# Patient Record
Sex: MALE | Race: WHITE | Employment: FULL TIME | URBAN - METROPOLITAN AREA
[De-identification: names, ages, dates, MRNs, and addresses within clinical notes are randomized per-mention and may not be internally consistent; named-entity substitution may affect disease eponyms.]

---

## 2017-01-17 ENCOUNTER — TELEPHONE (OUTPATIENT)
Dept: ONCOLOGY | Age: 27
End: 2017-01-17

## 2017-01-17 NOTE — TELEPHONE ENCOUNTER
Called patient and left a voicemail reminding patient to have his labs drawn prior to his appointment with  on 1/26.

## 2017-01-26 ENCOUNTER — OFFICE VISIT (OUTPATIENT)
Dept: ONCOLOGY | Age: 27
End: 2017-01-26

## 2017-01-26 VITALS
BODY MASS INDEX: 23.19 KG/M2 | TEMPERATURE: 97.1 F | WEIGHT: 153 LBS | HEART RATE: 60 BPM | SYSTOLIC BLOOD PRESSURE: 120 MMHG | DIASTOLIC BLOOD PRESSURE: 70 MMHG | OXYGEN SATURATION: 96 % | HEIGHT: 68 IN | RESPIRATION RATE: 16 BRPM

## 2017-01-26 DIAGNOSIS — C62.11 MALIGNANT NEOPLASM OF DESCENDED RIGHT TESTIS (HCC): Primary | ICD-10-CM

## 2017-01-26 DIAGNOSIS — C78.00 MALIGNANT NEOPLASM METASTATIC TO LUNG, UNSPECIFIED LATERALITY (HCC): ICD-10-CM

## 2017-01-26 NOTE — MR AVS SNAPSHOT
Visit Information Date & Time Provider Department Dept. Phone Encounter #  
 1/26/2017  1:30 PM MD Megan Linonhavekellie Oncology at [address] [id number] Follow-up Instructions Return in about 3 months (around 4/26/2017) for Bertha, surveillance nonseminoma. Follow-up and Disposition History Your Appointments 4/20/2017  1:30 PM  
ESTABLISHED PATIENT with MD Lucia Lino Oncology at [address] [facility] [address] [place] [address]) Appt Note: Radindiana, surveillance nonseminoma [address] [facility], [address] [facility] [address] [address]  
[phone]  
  
   
 [address] [facility], [address] [place] [address] Upcoming Health Maintenance Date Due Pneumococcal 19-64 Highest Risk (1 of 3 - PCV13) 4/21/2008 DTaP/Tdap/Td series (3 - Td) 6/23/2026 Allergies as of 1/26/2017  Review Complete On: 1/26/2017 By: Flor Gustafson MD  
 No Known Allergies Current Immunizations  Reviewed on 12/21/2015 Name Date Hepatitis B Vaccine 1/1/1993 Influenza Vaccine 10/31/2016, 11/9/2015 Influenza Vaccine (Quad) PF 11/9/2015 MMR Vaccine 1/1/1994 TD Vaccine 1/1/2003 Tdap 6/23/2016 Not reviewed this visit You Were Diagnosed With   
  
 Codes Comments Malignant neoplasm of descended right testis [facility])    -  Primary ICD-10-CM: C62.11 ICD-9-CM: 186.9 Malignant neoplasm metastatic to lung, unspecified laterality ([facility] [address]     ICD-10-CM: C78.00 ICD-9-CM: 197.0 Vitals BP Pulse Temp Resp Height(growth percentile) 120/70 (BP 1 Location: Right arm, BP Patient Position: Sitting) 60 97.1 °F (36.2 °C) (Temporal) 16 5' 8\" (1.727 m) Weight(growth percentile) SpO2 BMI Smoking Status 153 lb (69.4 kg) 96% 23.26 kg/m2 Never Smoker BMI and BSA Data Body Mass Index Body Surface Area  
 23.26 kg/m 2 1.82 m 2 Preferred Pharmacy Pharmacy Name Phone [id number]- [place], [address] AT [place] [phone] Your Updated Medication List  
  
   
This list is accurate as of: 1/26/17  2:21 PM.  Always use your most recent med list.  
  
  
  
  
 FISH OIL 1,000 mg Cap Generic drug:  omega-3 fatty acids-vitamin e Take 1 Cap by mouth daily. therapeutic multivitamin tablet Commonly known as:  [facility] Take 1 Tab by mouth daily. We Performed the Following   
 AFP, TUMOR MARKER, SERIAL [[id number] Custom] BETA-HCG, QT, SERIAL [[id number] Custom] LD [64430 CPT(R)] Follow-up Instructions Return in about 3 months (around 4/26/2017) for Radindiana, surveillance nonseminoma. To-Do List   
 Around 04/24/2017 Imaging:  CT CHEST ABD PELV W CONT Introducing [facility] SERVICES! Dear Clyde Martinez: 
Thank you for requesting a [employer] account. Our records indicate that you already have an active Skip Hop account. You can access your account anytime at [email or web address]. [email or web address] Did you know that you can access your hospital and ER discharge instructions at any time in Skip Hop? You can also review all of your test results from your hospital stay or ER visit. Additional Information If you have questions, please visit the Frequently Asked Questions section of the Skip Hop website at [email or web address] Remember, Skip Hop is NOT to be used for urgent needs. For medical emergencies, dial 911. Now available from your iPhone and Android! Please provide this summary of care documentation to your next provider. Your primary care clinician is listed as Libby Santacruz. If you have any questions after today's visit, please call [phone].

## 2017-01-26 NOTE — PROGRESS NOTES
[address] [facility] at [address] [place] [address]  [phone]    Hematology / Oncology Follow Up    Reason for Visit:   Ailyn Oconnell is a 31 y.o. male who is seen for follow up of testicular cancer. Treatment History:    · Presented with right testicular mass  · US scrotum 6/4/2015: 9.3 cm mixed solid more than cystic mass replaces the normal architecture of the right testicle. Normal sonographic appearance of the left testicle. · CTA Chest 6/4/2015: Bilateral lung masses  · CT A/P 6/4/2015: Massive retroperitoneal adenopathy, large right testicular mass (9cm)  · MRI Brain 6/4/2015: no intracranial metastatic disease  · Preoperative tumor Markers 6/4/2015: , .5, BHCG [id number]  · Right orchiectomy 6/9/2015 with Dr. 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 · Thoracic surgery 11/7/2015 by Dr. Lion Valentino at [facility] 1036 lobectomy, LLL wedge resection and left superior segment wedge resection: Necrotic nodules with surrounding histiocytic reaction and focal squaumous epithelium. No definitive viable malignant cells identified, findings consistent with treatment effect and complete response to chemotherapy. History of Present Illness:   He reports some depression, worsening in the past few months.   He had neuropsych testing and was told to follow up with me for the results. Denies pain. Breathing nearly back to baseline. Energy has improved since therapy, but not back to his prediagnosis baseline. PAST HISTORY: The following sections were reviewed and updated in the EMR as appropriate: PMH, SH, FH, Medications, Allergies. No Known Allergies     Review of Systems: A complete review of systems was obtained, reviewed, and scanned into the EMR. Pertinent findings reviewed above. Physical Exam:     Visit Vitals    /70 (BP 1 Location: Right arm, BP Patient Position: Sitting)    Pulse 60    Temp 97.1 °F (36.2 °C) (Temporal)    Resp 16    Ht 5' 8\" (1.727 m)    Wt 153 lb (69.4 kg)    SpO2 96%    BMI 23.26 kg/m2     ECOG PS: 0  General: No distress  Eyes: PERRLA, anicteric sclerae  HENT: Atraumatic, OP clear  Neck: Supple  Lymphatic: No cervical, supraclavicular, or inguinal adenopathy  Respiratory: CTAB, normal respiratory effort  CV: Regular rate and rhythm, no murmurs, no peripheral edema. GI: Soft, nontender, nondistended, no masses, no hepatomegaly, no splenomegaly  MS: Normal gait and station. Digits without clubbing or cyanosis. Skin: No rash, ecchymosis or petechiae. Normal color, turgor, texture. Psych: Alert, oriented, appropriate affect, normal judgment/insight    Results:     Lab Results   Component Value Date/Time    WBC 4.1 06/27/2016 11:33 AM    HGB 13.6 06/27/2016 11:33 AM    HCT 40.7 06/27/2016 11:33 AM    PLATELET 721 18/99/3537 11:33 AM    MCV 85 06/27/2016 11:33 AM    ABS.  NEUTROPHILS 1.6 06/27/2016 11:33 AM    Hemoglobin (POC) 10.9 08/24/2015 08:15 AM    Hematocrit (POC) 32 08/24/2015 08:15 AM     Lab Results   Component Value Date/Time    Sodium 141 06/27/2016 11:33 AM    Potassium 4.9 06/27/2016 11:33 AM    Chloride 103 06/27/2016 11:33 AM    CO2 24 06/27/2016 11:33 AM    Glucose 91 06/27/2016 11:33 AM    BUN 18 06/27/2016 11:33 AM    Creatinine 1.01 06/27/2016 11:33 AM    GFR est  06/27/2016 11:33 AM    GFR est non- 06/27/2016 11:33 AM    Calcium 9.0 06/27/2016 11:33 AM    Sodium (POC) 138 08/24/2015 08:15 AM    Potassium (POC) 3.9 08/24/2015 08:15 AM    Chloride (POC) 101 08/24/2015 08:15 AM    Glucose (POC) 93 08/24/2015 08:15 AM    BUN (POC) 15 08/24/2015 08:15 AM    Creatinine (POC) 0.8 08/24/2015 08:15 AM    Calcium, ionized (POC) 1.24 08/24/2015 08:15 AM     Lab Results   Component Value Date/Time    Bilirubin, total 0.6 06/27/2016 11:33 AM    ALT 37 06/27/2016 11:33 AM    AST 32 06/27/2016 11:33 AM    Alk. phosphatase 77 06/27/2016 11:33 AM    Protein, total 6.6 06/27/2016 11:33 AM    Albumin 4.2 06/27/2016 11:33 AM    Globulin 3.8 12/21/2015 11:24 AM     LDH:  Recent Labs      01/19/17   1538  10/17/16   1351  08/16/16   1315  06/16/16   1434  04/05/16   1343   LDH  170  207  159  162  168     Beta-HCG:  Recent Labs      01/19/17   1538  10/17/16   1351  08/16/16   1315  06/16/16   1434  04/05/16   1343   [id number]  <1  <1  <1  <1  <1     AFP:  Recent Labs      01/19/17   1538  10/17/16   1351  08/16/16   1315  06/16/16   1434  04/05/16   1343   AFPTM1  2.2  2.0  2.0  1.8  1.8       CT C/A/P 10/24/2016: No evidence of recurrence      Assessment:   1) Testicular Cancer (Non-seminoma)  Stage IIIC  He had disease involving his testicle, retroperitoneum, and lungs. Poor risk disease based on pretreatment BHCG of 219k, which translates to a 5 year OS of about 50%. He has been managed with curative intent. He is s/p chemotherapy with 4 cycles of BEP, followed by RPLND and bilateral VATS to resect residual teratoma. He is feeling well and has no evidence of disease recurrence at this time. We will continue with active surveillance. Surveillance for patients with Stage II or III non-seminoma after complete response to chemotherapy +/- RPLND (per NCCN guidelines version 2.2015):   Year 1: H&P and tumor markers every 2 months, CXR and CT A/P every 6 months  Year 2: H&P and tumor markers every 3 months, CXR every 6 months, CT A/P every 12 months  Year 3&4: H&P and tumor markers every 6 months, CXR every 12 months  Year 5: H&P and tumor markers every 6 months    Given his known pulmonary involvement, we will get CT Chest instead of CXR. 2) Cough / Shortness of breath  Nearly resolved. Secondary to bleomycin. 3) Poor concentration  Neuropsych testing suggested a component of anxiety/depression. Patient now endorses some depression. He is not interested in antidepressants, but is willing to meet with a psychologist.   to meet with him today to provide resources.     Plan:     ·  to meet with patient today, provide resources and counseling, information about psychologists  · Labs in 3 months: AFP, beta-HCG, LD ([facility])  · CT C/A/P in 3 months  · Return to clinic in 3 months       Signed By: Angelina Payton MD     January 26, 2017

## 2017-01-26 NOTE — PROGRESS NOTES
Oncology Navigator  Psychosocial Assessment    Reason for Assessment:    [x]Depression  []Anxiety  []Caregiver [place]  []Maladaptive Coping with Serious Illness   []Other:    Sources of Information:    [x]Patient  []Family  []Staff  []Medical Record    Advance Care Planning: Pt has been provided information regarding advance medical directives.    Advance Care Planning 6/8/2015   Patient's Healthcare Decision Maker is: -   Confirm Advance Directive None       Mental Status:    [x]Alert  []Lethargic  []Unresponsive  Oriented to:  [x]Person  [x]Place  [x]Time  [x]Situation      Barriers to Learning:    []Language  []Developmental  []Cognitive  []Altered Mental Status  []Visual/Hearing Impairment  []Unable to Read/Write  []Motivational   [x]No Barriers Identified  []Other:    Relationship Status:  [x]Single  []  []Significant Other/Life Partner  []  []  []      Living Circumstances:  []Lives Alone  [x]Family/Significant Other in Household  []Roommates  []Children in the Home  []Paid Caregivers  []Assisted Living Facility/Group Home  []Skilled [address]  []Homeless  []Incarcerated  []Environmental/Care Concerns  []Other:    Support System:    [x]Strong  []Fair  []Limited    Financial/Legal Concerns:    []Uninsured  []Limited Income/Resources  []Non-Citizen  [x]No Concerns Identified  []Financial POA:    []Other:    Latter-day/Spiritual/Existential:  []Strong Sense of Spirituality  []Involved in [employer]  []Request  Visit  []Expressing Flores Solian  [x]No Concerns Identified    Coping with Illness:         Patient: Family/Caregiver:   Understanding and Acceptance of Illness/Prognosis  [x] []   Strong Sense of Resilience [] []   Self Reflection [] []   Engaged Support System [] []   Does not Readily Discuss Illness [] []   Denial of Terminal Status [] []   Anger [] []   Depression [x] []   Anxiety/Fear [] []   Bargaining [] []   Recent Diagnosis/Prognosis [] []   Difficulties with Body Image [] []   Loss of Identity [] []   Excessive Substance Use [] []   Mental Health History [] []   Enmeshed Relationships [] []   History of Loss [] []   Anticipatory Grief [] []   Concern for Complicated Grief [] []   Suicidal Ideation or Plan [] []   Unable to assess [] []                  Narrative: Pt returns to clinic for follow up. Met with pt to discuss counseling resources. Pt shared that he is quite busy with his job and going to school. He is concerned about taking time away from that for additional appointments. Reviewed counseling resources available to pt to include local agencies that are conveniently located to pt's home. Also offered Ben Alcantara LCSW services through [facility]. Pt is open to this and tells me he will Lorenda Morning it a one time try. \" Explained I will make the referral to [facility] and they will follow up with pt re: appointment time. Also provided pt with list of counselors and agencies should he need additional support. Pt voiced understanding and is in agreement with this plan. Referrals:     I. Transportation    Medicaid ([employer]) []   ACS Road to Recovery []                                    Regional organization  []                                      Financial Assistance/Medication Access    Patient assistance program  []   Co-pay assistance  []                                    Leukemia & Lymphoma Society []   [address]  []   [address] []   CancerCare  []     Emotional support    Peer support group []   Local counseling [x]                                    Online support group []   Coordination of psychiatry consult []     Goals/Plan:   1. Referral to [facility] for counseling  2. Counseling and psychiatry resources  3.  Ongoing support

## 2017-02-07 ENCOUNTER — TELEPHONE (OUTPATIENT)
Dept: ONCOLOGY | Age: 27
End: 2017-02-07

## 2017-02-07 NOTE — TELEPHONE ENCOUNTER
[employer]  Medical Oncology at [address] [facility] with pt about psychiatry appointment recommendation. Pt is willing to see psychiatry for at least one visit to discuss medication management for his depression. Pt is willing to see a counselor there as well. Recommended Dr. Donahue Sees here in [place]: [phone]. There are counselors in that office as well. Pt will call today to set up his appointment. Addendum:  Pt called and said he was able to get an appointment with Dr. Godfrey Neri on Apr 3 and will see a counselor in that same office on Apr 4. Thanked pt for providing this info and encouraged him to call should he have any questions or needs prior to these appointments.

## 2017-03-16 PROBLEM — H18.609 KERATOCONUS: Status: ACTIVE | Noted: 2017-01-23

## 2017-04-14 LAB
AFP-TM SERPL-MCNC: 2.2 NG/ML (ref 0–8.3)
HCG INTACT+B SERPL-ACNC: <1 MIU/ML (ref 0–3)
LDH SERPL-CCNC: 150 IU/L (ref 121–224)

## 2017-04-17 ENCOUNTER — HOSPITAL ENCOUNTER (OUTPATIENT)
Dept: CT IMAGING | Age: 27
Discharge: HOME | End: 2017-04-17
Attending: INTERNAL MEDICINE
Payer: COMMERCIAL

## 2017-04-17 DIAGNOSIS — C62.11 MALIGNANT NEOPLASM OF DESCENDED RIGHT TESTIS (HCC): ICD-10-CM

## 2017-04-17 DIAGNOSIS — C78.00 MALIGNANT NEOPLASM METASTATIC TO LUNG, UNSPECIFIED LATERALITY (HCC): ICD-10-CM

## 2017-04-17 PROCEDURE — 74177 CT ABD & PELVIS W/CONTRAST: CPT

## 2017-04-17 PROCEDURE — 74011636320 HC RX REV CODE- 636/320: Performed by: RADIOLOGY

## 2017-04-17 RX ADMIN — IOPAMIDOL 95 ML: 755 INJECTION, SOLUTION INTRAVENOUS at 08:24

## 2017-04-20 ENCOUNTER — OFFICE VISIT (OUTPATIENT)
Dept: ONCOLOGY | Age: 27
End: 2017-04-20

## 2017-04-20 VITALS
WEIGHT: 155 LBS | OXYGEN SATURATION: 98 % | DIASTOLIC BLOOD PRESSURE: 71 MMHG | TEMPERATURE: 97.8 F | SYSTOLIC BLOOD PRESSURE: 135 MMHG | BODY MASS INDEX: 23.49 KG/M2 | RESPIRATION RATE: 16 BRPM | HEART RATE: 61 BPM | HEIGHT: 68 IN

## 2017-04-20 DIAGNOSIS — C62.11 MALIGNANT NEOPLASM OF DESCENDED RIGHT TESTIS (HCC): Primary | ICD-10-CM

## 2017-04-20 DIAGNOSIS — R41.840 DIFFICULTY CONCENTRATING: ICD-10-CM

## 2017-04-20 RX ORDER — BUPROPION HYDROCHLORIDE 150 MG/1
TABLET, EXTENDED RELEASE ORAL
Refills: 0 | COMMUNITY
Start: 2017-04-03 | End: 2019-10-30 | Stop reason: ALTCHOICE

## 2017-04-20 RX ORDER — CLONAZEPAM 1 MG/1
TABLET ORAL
Refills: 0 | COMMUNITY
Start: 2017-04-03 | End: 2021-01-15 | Stop reason: ALTCHOICE

## 2017-04-20 NOTE — MR AVS SNAPSHOT
Visit Information Date & Time Provider Department Dept. Phone Encounter #  
 4/20/2017  1:30 PM Rigoberto Villagomez [facility] at [address] [place] [address] Rd [id number] Follow-up Instructions Return in about 3 months (around 7/20/2017) for rob Stone testicular cancer. Upcoming Health Maintenance Date Due Pneumococcal 19-64 Highest Risk (1 of 3 - PCV13) 4/21/2008 DTaP/Tdap/Td series (3 - Td) 6/23/2026 Allergies as of 4/20/2017  Review Complete On: 4/17/2017 By: Dulce Maria Rojo No Known Allergies Current Immunizations  Reviewed on 12/21/2015 Name Date Hepatitis B Vaccine 1/1/1993 Influenza Vaccine 10/31/2016, 11/9/2015 Influenza Vaccine (Quad) PF 11/9/2015 MMR Vaccine 1/1/1994 TD Vaccine 1/1/2003 Tdap 6/23/2016 Not reviewed this visit You Were Diagnosed With   
  
 Codes Comments Malignant neoplasm of descended right testis [facility])    -  Primary ICD-10-CM: C62.11 ICD-9-CM: 186. 9 Vitals BP Pulse Temp Resp Height(growth percentile) 135/71 (BP 1 Location: Left arm, BP Patient Position: Sitting) 61 97.8 °F (36.6 °C) (Temporal) 16 5' 8\" (1.727 m) Weight(growth percentile) SpO2 BMI Smoking Status 155 lb (70.3 kg) 98% 23.57 kg/m2 Never Smoker BMI and BSA Data Body Mass Index Body Surface Area  
 23.57 kg/m 2 1.84 m 2 Preferred Pharmacy Pharmacy Name Phone [id number]- [address] AT [facility] [phone] Your Updated Medication List  
  
   
This list is accurate as of: 4/20/17  2:04 PM.  Always use your most recent med list.  
  
  
  
  
 buPROPion  mg SR tablet Commonly known as:  WELLBUTRIN SR  
TK 1 T PO  QD  
  
 clonazePAM 1 mg tablet Commonly known as:  Jeani Moulding TK 1/2 TO 1 T PO QHS FISH OIL 1,000 mg Cap Generic drug:  omega-3 fatty acids-vitamin e Take 1 Cap by mouth daily. therapeutic multivitamin tablet Commonly known as:  [facility] Take 1 Tab by mouth daily. We Performed the Following   
 AFP, TUMOR MARKER, SERIAL [[id number] Custom] BETA-HCG, QT, SERIAL [[id number] Custom] LD [48731 CPT(R)] Follow-up Instructions Return in about 3 months (around 7/20/2017) for Bertha, surveillance testicular cancer. Introducing [facility] & HEALTH SERVICES! Dear Miranda London: 
Thank you for requesting a [employer] account. Our records indicate that you already have an active [employer] account. You can access your account anytime at [email or web address]. [email or web address] Did you know that you can access your hospital and ER discharge instructions at any time in [employer]? You can also review all of your test results from your hospital stay or ER visit. Additional Information If you have questions, please visit the Frequently Asked Questions section of the [employer] website at [email or web address] Remember, [employer] is NOT to be used for urgent needs. For medical emergencies, dial 911. Now available from your iPhone and Android! Please provide this summary of care documentation to your next provider. Your primary care clinician is listed as David Fuller. If you have any questions after today's visit, please call [phone].

## 2017-04-21 NOTE — PROGRESS NOTES
[address] [facility] Oncology at [address] [facility]  [phone]    Hematology / Oncology Follow Up    Reason for Visit:   Mario Alberto Anthony is a 31 y.o. male who is seen for follow up of testicular cancer. Treatment History:    · Presented with right testicular mass  · US scrotum 6/4/2015: 9.3 cm mixed solid more than cystic mass replaces the normal architecture of the right testicle. Normal sonographic appearance of the left testicle. · CTA Chest 6/4/2015: Bilateral lung masses  · CT A/P 6/4/2015: Massive retroperitoneal adenopathy, large right testicular mass (9cm)  · MRI Brain 6/4/2015: no intracranial metastatic disease  · Preoperative tumor Markers 6/4/2015: , .5, BHCG [id number]  · Right orchiectomy 6/9/2015 with Dr. 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 Carmencita Banuelos at [facility] lymph nodes for evaluation; Ntqbj-eqpfl-qytfv-metastatic mature teratoma 2.2 cm, multiple necrotic nodules without residual viable germ cell tumor identified; para-aortic-metastatic mature teratoma 0.9 cm, multiple necrotic nodules without residual viable germ cell tumor identified; Spermatic cord (right)-fibrovascular soft tissue, no germ cell tumor identified; Right pelvic- 0/2 nodes involved, no necrotic nodules or germ cell tumor identified. · Thoracic surgery 11/7/2015 by Dr. Oni Lopez at [facility] 1036 lobectomy, LLL wedge resection and left superior segment wedge resection: Necrotic nodules with surrounding histiocytic reaction and focal squaumous epithelium. No definitive viable malignant cells identified, findings consistent with treatment effect and complete response to chemotherapy. History of Present Illness:   He is feeling pretty well.   Saw psychiatry and psychologist. Started on two new medications, and he is already noticing some improvement in his symptoms. Back to studying and concentrating a bit better. No pain. No dyspnea or cough. Urinating ok. PAST HISTORY: The following sections were reviewed and updated in the EMR as appropriate: PMH, SH, FH, Medications, Allergies. No Known Allergies     Review of Systems: A complete review of systems was obtained, reviewed, and scanned into the EMR. Pertinent findings reviewed above. Physical Exam:     Visit Vitals    /71 (BP 1 Location: Left arm, BP Patient Position: Sitting)    Pulse 61    Temp 97.8 °F (36.6 °C) (Temporal)    Resp 16    Ht 5' 8\" (1.727 m)    Wt 155 lb (70.3 kg)    SpO2 98%    BMI 23.57 kg/m2     ECOG PS: 0  General: No distress  Eyes: PERRLA, anicteric sclerae  HENT: Atraumatic, OP clear  Neck: Supple  Lymphatic: No cervical, supraclavicular, or inguinal adenopathy  Respiratory: CTAB, normal respiratory effort  CV: Regular rate and rhythm, no murmurs, no peripheral edema. GI: Soft, nontender, nondistended, no masses, no hepatomegaly, no splenomegaly  MS: Normal gait and station. Digits without clubbing or cyanosis. Skin: No rash, ecchymosis or petechiae. Normal color, turgor, texture. Psych: Alert, oriented, appropriate affect, normal judgment/insight    Results:     Lab Results   Component Value Date/Time    WBC 4.1 06/27/2016 11:33 AM    HGB 13.6 06/27/2016 11:33 AM    HCT 40.7 06/27/2016 11:33 AM    PLATELET 485 82/41/6586 11:33 AM    MCV 85 06/27/2016 11:33 AM    ABS.  NEUTROPHILS 1.6 06/27/2016 11:33 AM    Hemoglobin (POC) 10.9 08/24/2015 08:15 AM    Hematocrit (POC) 32 08/24/2015 08:15 AM     Lab Results   Component Value Date/Time    Sodium 141 06/27/2016 11:33 AM    Potassium 4.9 06/27/2016 11:33 AM    Chloride 103 06/27/2016 11:33 AM    CO2 24 06/27/2016 11:33 AM    Glucose 91 06/27/2016 11:33 AM    BUN 18 06/27/2016 11:33 AM    Creatinine 1.01 06/27/2016 11:33 AM    GFR est  06/27/2016 11:33 AM    GFR est non- 06/27/2016 11:33 AM    Calcium 9.0 06/27/2016 11:33 AM    Sodium (POC) 138 08/24/2015 08:15 AM    Potassium (POC) 3.9 08/24/2015 08:15 AM    Chloride (POC) 101 08/24/2015 08:15 AM    Glucose (POC) 93 08/24/2015 08:15 AM    BUN (POC) 15 08/24/2015 08:15 AM    Creatinine (POC) 0.8 08/24/2015 08:15 AM    Calcium, ionized (POC) 1.24 08/24/2015 08:15 AM     Lab Results   Component Value Date/Time    Bilirubin, total 0.6 06/27/2016 11:33 AM    ALT (SGPT) 37 06/27/2016 11:33 AM    AST (SGOT) 32 06/27/2016 11:33 AM    Alk. phosphatase 77 06/27/2016 11:33 AM    Protein, total 6.6 06/27/2016 11:33 AM    Albumin 4.2 06/27/2016 11:33 AM    Globulin 3.8 12/21/2015 11:24 AM     LDH:  Recent Labs      04/13/17   1503  01/19/17   1538  10/17/16   1351  08/16/16   1315   LDH  150  170  207  159     Beta-HCG:  Recent Labs      04/13/17   1503  01/19/17   1538  10/17/16   1351  08/16/16   1315   [id number]  <1  <1  <1  <1     AFP:  Recent Labs      04/13/17   1503  01/19/17   1538  10/17/16   1351  08/16/16   1315   AFPTM1  2.2  2.2  2.0  2.0       CT C/A/P 10/24/2016: No evidence of recurrence  CT C/A/P 04/17/2017: No evidence of recurrence    Assessment:   1) Testicular Cancer (Non-seminoma)  Stage IIIC  He had disease involving his testicle, retroperitoneum, and lungs. Poor risk disease based on pretreatment BHCG of 219k, which translates to a 5 year OS of about 50%. He has been managed with curative intent. He is s/p chemotherapy with 4 cycles of BEP, followed by RPLND and bilateral VATS to resect residual teratoma. He is feeling well and has no evidence of disease recurrence at this time. We will continue with active surveillance. Surveillance for patients with Stage II or III non-seminoma after complete response to chemotherapy +/- RPLND (per NCCN guidelines version 2.2015):   Year 1: H&P and tumor markers every 2 months, CXR and CT A/P every 6 months  Year 2: H&P and tumor markers every 3 months, CXR every 6 months, CT A/P every 12 months  Year 3&4: H&P and tumor markers every 6 months, CXR every 12 months  Year 5: H&P and tumor markers every 6 months    Given his known pulmonary involvement, we will get CT Chest instead of CXR. 2) Cough / Shortness of breath  Resolved    3) Poor concentration  Improving since starting medications for mood    4) Anxiety/Depression  On medications from psychiatry which are helping. Seeing counselor.     Plan:     · Labs in 3 months: AFP, beta-HCG, LD ([facility])  · CT C/A/P in 6 months (2 years from end of therapy)  · Return to clinic in 3 months       Signed By: Jhonny Michael MD     April 20, 2017

## 2017-07-03 ENCOUNTER — TELEPHONE (OUTPATIENT)
Dept: ONCOLOGY | Age: 27
End: 2017-07-03

## 2017-07-03 NOTE — TELEPHONE ENCOUNTER
Called patient at the request of Dr. Silverio Underwood, and left a voicemail requesting a call back to inform patient that Dr. Silverio Underwood has openings on Wednesday 7/12 and Thursday 7/13 if patient could change his appointment to one of those days. Waiting for a return call. **12:55PM   Patient called and stated that he can not change his appointment because he has already requested off at work.

## 2017-07-07 LAB
AFP-TM SERPL-MCNC: 2 NG/ML (ref 0–8.3)
HCG INTACT+B SERPL-ACNC: <1 MIU/ML (ref 0–3)
LDH SERPL-CCNC: 156 IU/L (ref 121–224)

## 2017-07-14 ENCOUNTER — OFFICE VISIT (OUTPATIENT)
Dept: ONCOLOGY | Age: 27
End: 2017-07-14

## 2017-07-14 VITALS
OXYGEN SATURATION: 96 % | RESPIRATION RATE: 20 BRPM | HEART RATE: 75 BPM | DIASTOLIC BLOOD PRESSURE: 86 MMHG | HEIGHT: 68 IN | BODY MASS INDEX: 23.19 KG/M2 | TEMPERATURE: 97.5 F | WEIGHT: 153 LBS | SYSTOLIC BLOOD PRESSURE: 139 MMHG

## 2017-07-14 DIAGNOSIS — F41.9 ANXIETY: ICD-10-CM

## 2017-07-14 DIAGNOSIS — C62.11 MALIGNANT NEOPLASM OF DESCENDED RIGHT TESTIS (HCC): Primary | ICD-10-CM

## 2017-07-14 DIAGNOSIS — R41.840 DIFFICULTY CONCENTRATING: ICD-10-CM

## 2017-07-14 DIAGNOSIS — C78.00 MALIGNANT NEOPLASM METASTATIC TO LUNG, UNSPECIFIED LATERALITY (HCC): ICD-10-CM

## 2017-07-14 RX ORDER — ARIPIPRAZOLE 5 MG/1
TABLET ORAL
Refills: 2 | COMMUNITY
Start: 2017-07-06 | End: 2019-10-30 | Stop reason: ALTCHOICE

## 2017-07-14 NOTE — MR AVS SNAPSHOT
Visit Information Date & Time Provider Department Dept. Phone Encounter #  
 7/14/2017  1:30 PM MD [facility] at [address] [place] [address] [phone] Follow-up Instructions Return in about 3 months (around 10/14/2017) for Bertha, surveillance testicular cancer. Your Appointments 10/18/2017  1:00 PM  
ESTABLISHED PATIENT with MD [facility] On[facility] at [address] [place] [address] [address]) Appt Note: Bertha, surveillance testicular cancer [address] [facility], [address] [facility] [phone]  
[phone]  
  
   
 [address] [facility], [address] [place] [address] Upcoming Health Maintenance Date Due Pneumococcal 19-64 Highest Risk (1 of 3 - PCV13) 4/21/2008 INFLUENZA AGE 9 TO ADULT 8/1/2017 DTaP/Tdap/Td series (3 - Td) 6/23/2026 Allergies as of 7/14/2017  Review Complete On: 7/14/2017 By: Cira Del Cid MD  
 No Known Allergies Current Immunizations  Reviewed on 12/21/2015 Name Date Hepatitis B Vaccine 1/1/1993 Influenza Vaccine 10/31/2016, 11/9/2015 Influenza Vaccine (Quad) PF 11/9/2015 MMR Vaccine 1/1/1994 TD Vaccine 1/1/2003 Tdap 6/23/2016 Not reviewed this visit You Were Diagnosed With   
  
 Codes Comments Malignant neoplasm of descended right testis [facility])    -  Primary ICD-10-CM: C62.11 ICD-9-CM: 186. 9 Difficulty concentrating     ICD-10-CM: R41.840 ICD-9-CM: 799.51 Anxiety     ICD-10-CM: F41.9 ICD-9-CM: 300.00 Malignant neoplasm metastatic to lung, unspecified laterality ([facility] [address]     ICD-10-CM: C78.00 ICD-9-CM: 197.0 Vitals BP Pulse Temp Resp Height(growth percentile) 139/86 (BP 1 Location: Right arm, BP Patient Position: Sitting) 75 97.5 °F (36.4 °C) (Temporal) 20 5' 8\" (1.727 m) Weight(growth percentile) SpO2 BMI Smoking Status 153 lb (69.4 kg) 96% 23.26 kg/m2 Never Smoker BMI and BSA Data Body Mass Index Body Surface Area  
 23.26 kg/m 2 1.82 m 2 Preferred Pharmacy Pharmacy Name Phone [facility] [address], [address] [place] [address] [address]  [facility] [phone] Your Updated Medication List  
  
   
This list is accurate as of: 7/14/17  1:43 PM.  Always use your most recent med list.  
  
  
  
  
 ARIPiprazole 5 mg tablet Commonly known as:  ABILIFY TK 1 T PO D  
  
 buPROPion  mg SR tablet Commonly known as:  WELLBUTRIN SR  
TK 1 T PO  QD  
  
 clonazePAM 1 mg tablet Commonly known as:  Dala Pleasure TK 1/2 TO 1 T PO QHS FISH OIL 1,000 mg Cap Generic drug:  omega-3 fatty acids-vitamin e Take 1 Cap by mouth daily. therapeutic multivitamin tablet Commonly known as:  [facility] Take 1 Tab by mouth daily. We Performed the Following   
 AFP, TUMOR MARKER, SERIAL [[id number] Custom] BETA-HCG, QT, SERIAL [[id number] Custom] LD [75909 CPT(R)] Follow-up Instructions Return in about 3 months (around 10/14/2017) for Bertha, surveillance testicular cancer. To-Do List   
 Around 10/09/2017 Imaging:  CT CHEST ABD PELV W CONT Introducing [facility]! Dear Darvin Chino: 
Thank you for requesting a [employer] account. Our records indicate that you already have an active Speak[employer] account. You can access your account anytime at [email or web address] Did you know that you can access your hospital and ER discharge instructions at any time in [employer]ak[employer]? You can also review all of your test results from your hospital stay or ER visit. Additional Information If you have questions, please visit the Frequently Asked Questions section of the [employer]ak[employer] website at [email or web address] Remember, [employer]ak[employer] is NOT to be used for urgent needs. For medical emergencies, dial 911. Now available from your iPhone and Android! Please provide this summary of care documentation to your next provider. Your primary care clinician is listed as Saranya Vital. If you have any questions after today's visit, please call [phone].

## 2017-07-14 NOTE — PROGRESS NOTES
[address] [facility] Oncology at [address] [place] [address]  [phone]    Hematology / Oncology Follow Up    Reason for Visit:   Ashley Dominguez is a 28 y.o. male who is seen for follow up of testicular cancer. Treatment History:    · Presented with right testicular mass  · US scrotum 6/4/2015: 9.3 cm mixed solid more than cystic mass replaces the normal architecture of the right testicle. Normal sonographic appearance of the left testicle. · CTA Chest 6/4/2015: Bilateral lung masses  · CT A/P 6/4/2015: Massive retroperitoneal adenopathy, large right testicular mass (9cm)  · MRI Brain 6/4/2015: no intracranial metastatic disease  · Preoperative tumor Markers 6/4/2015: , .5, BHCG [id number]  · Right orchiectomy 6/9/2015 with Dr. 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 Percy Collazo at [facility] lymph nodes for evaluation; Mszhu-syzmu-nuisg-metastatic mature teratoma 2.2 cm, multiple necrotic nodules without residual viable germ cell tumor identified; para-aortic-metastatic mature teratoma 0.9 cm, multiple necrotic nodules without residual viable germ cell tumor identified; Spermatic cord (right)-fibrovascular soft tissue, no germ cell tumor identified; Right pelvic- 0/2 nodes involved, no necrotic nodules or germ cell tumor identified. · Thoracic surgery 11/7/2015 by Dr. Yecenia Maddox at [facility] 1036 lobectomy, LLL wedge resection and left superior segment wedge resection: Necrotic nodules with surrounding histiocytic reaction and focal squaumous epithelium. No definitive viable malignant cells identified, findings consistent with treatment effect and complete response to chemotherapy. History of Present Illness:    Following with psychiatry, now on abilify in addition to wellbutrin and klonipin. Concentration seems to be improving, as does his mood. No pain. No dyspnea or cough. No bowel issues. No urinating problems. PAST HISTORY: The following sections were reviewed and updated in the EMR as appropriate: PMH, SH, FH, Medications, Allergies. No Known Allergies     Review of Systems: A complete review of systems was obtained, reviewed, and scanned into the EMR. Pertinent findings reviewed above. Physical Exam:     Visit Vitals    /86 (BP 1 Location: Right arm, BP Patient Position: Sitting)    Pulse 75    Temp 97.5 °F (36.4 °C) (Temporal)    Resp 20    Ht 5' 8\" (1.727 m)    Wt 153 lb (69.4 kg)    SpO2 96%    BMI 23.26 kg/m2     ECOG PS: 0  General: No distress  Eyes: PERRLA, anicteric sclerae  HENT: Atraumatic, OP clear  Neck: Supple  Lymphatic: No cervical, supraclavicular, or inguinal adenopathy  Respiratory: CTAB, normal respiratory effort  CV: Regular rate and rhythm, no murmurs, no peripheral edema. GI: Soft, nontender, nondistended, no masses, no hepatomegaly, no splenomegaly  MS: Normal gait and station. Digits without clubbing or cyanosis. Skin: No rash, ecchymosis or petechiae. Normal color, turgor, texture. Psych: Alert, oriented, appropriate affect, normal judgment/insight    Results:     Lab Results   Component Value Date/Time    WBC 4.1 06/27/2016 11:33 AM    HGB 13.6 06/27/2016 11:33 AM    HCT 40.7 06/27/2016 11:33 AM    PLATELET 890 79/66/2007 11:33 AM    MCV 85 06/27/2016 11:33 AM    ABS.  NEUTROPHILS 1.6 06/27/2016 11:33 AM    Hemoglobin (POC) 10.9 08/24/2015 08:15 AM    Hematocrit (POC) 32 08/24/2015 08:15 AM     Lab Results   Component Value Date/Time    Sodium 141 06/27/2016 11:33 AM    Potassium 4.9 06/27/2016 11:33 AM    Chloride 103 06/27/2016 11:33 AM    CO2 24 06/27/2016 11:33 AM    Glucose 91 06/27/2016 11:33 AM    BUN 18 06/27/2016 11:33 AM    Creatinine 1.01 06/27/2016 11:33 AM    GFR est  06/27/2016 11:33 AM GFR est non- 06/27/2016 11:33 AM    Calcium 9.0 06/27/2016 11:33 AM    Sodium (POC) 138 08/24/2015 08:15 AM    Potassium (POC) 3.9 08/24/2015 08:15 AM    Chloride (POC) 101 08/24/2015 08:15 AM    Glucose (POC) 93 08/24/2015 08:15 AM    BUN (POC) 15 08/24/2015 08:15 AM    Creatinine (POC) 0.8 08/24/2015 08:15 AM    Calcium, ionized (POC) 1.24 08/24/2015 08:15 AM     Lab Results   Component Value Date/Time    Bilirubin, total 0.6 06/27/2016 11:33 AM    ALT (SGPT) 37 06/27/2016 11:33 AM    AST (SGOT) 32 06/27/2016 11:33 AM    Alk. phosphatase 77 06/27/2016 11:33 AM    Protein, total 6.6 06/27/2016 11:33 AM    Albumin 4.2 06/27/2016 11:33 AM    Globulin 3.8 12/21/2015 11:24 AM     LDH:  Recent Labs      07/06/17   1405  04/13/17   1503  01/19/17   1538  10/17/16   1351   LDH  156  150  170  207     Beta-HCG:  Recent Labs      07/06/17   1405  04/13/17   1503  01/19/17   1538  10/17/16   1351   [id number]  <1  <1  <1  <1     AFP:  Recent Labs      07/06/17   1405  04/13/17   1503  01/19/17   1538  10/17/16   1351   AFP  2.0  2.2  2.2  2.0         CT C/A/P 10/24/2016: No evidence of recurrence  CT C/A/P 04/17/2017: No evidence of recurrence    Assessment:   1) Testicular Cancer (Non-seminoma)  Stage IIIC  He had disease involving his testicle, retroperitoneum, and lungs. Poor risk disease based on pretreatment BHCG of 219k, which translates to a 5 year OS of about 50%. He has been managed with curative intent. He is s/p chemotherapy with 4 cycles of BEP, followed by RPLND and bilateral VATS to resect residual teratoma. He is feeling well and has no evidence of disease recurrence at this time. We will continue with active surveillance. Repeat CT and labs in 3 months (2 years out from surgery), then we can reduce the frequency of checks to 6 month visits and yearly scans. Include CT Chest given prior teratoma in lungs.     2) Poor concentration  Improving since starting medications for mood    3) Anxiety/Depression  On medications from psychiatry which are helping. Seeing counselor.     Plan:     · Labs in 3 months: AFP, beta-HCG, LD ([facility])  · CT C/A/P in 3 months (2 years from end of therapy)  · Return to clinic in 3 months       Signed By: Juan Diego Suarez MD     July 14, 2017

## 2017-10-09 ENCOUNTER — HOSPITAL ENCOUNTER (OUTPATIENT)
Dept: CT IMAGING | Age: 27
Discharge: HOME | End: 2017-10-09
Attending: INTERNAL MEDICINE
Payer: SELF-PAY

## 2017-10-09 DIAGNOSIS — C62.11 MALIGNANT NEOPLASM OF DESCENDED RIGHT TESTIS (HCC): ICD-10-CM

## 2017-10-09 DIAGNOSIS — R41.840 DIFFICULTY CONCENTRATING: ICD-10-CM

## 2017-10-09 DIAGNOSIS — F41.9 ANXIETY: ICD-10-CM

## 2017-10-09 DIAGNOSIS — C78.00 MALIGNANT NEOPLASM METASTATIC TO LUNG, UNSPECIFIED LATERALITY (HCC): ICD-10-CM

## 2017-10-09 PROCEDURE — 74177 CT ABD & PELVIS W/CONTRAST: CPT

## 2017-10-09 PROCEDURE — 74011636320 HC RX REV CODE- 636/320: Performed by: RADIOLOGY

## 2017-10-09 RX ADMIN — IOPAMIDOL 100 ML: 755 INJECTION, SOLUTION INTRAVENOUS at 14:28

## 2017-10-12 LAB
AFP-TM SERPL-MCNC: 1.5 NG/ML (ref 0–8.3)
HCG INTACT+B SERPL-ACNC: <1 MIU/ML (ref 0–3)
LDH SERPL-CCNC: 142 IU/L (ref 121–224)

## 2017-10-18 ENCOUNTER — OFFICE VISIT (OUTPATIENT)
Dept: ONCOLOGY | Age: 27
End: 2017-10-18

## 2017-10-18 VITALS
WEIGHT: 162 LBS | HEART RATE: 72 BPM | SYSTOLIC BLOOD PRESSURE: 133 MMHG | DIASTOLIC BLOOD PRESSURE: 75 MMHG | OXYGEN SATURATION: 99 % | HEIGHT: 68 IN | TEMPERATURE: 95.9 F | BODY MASS INDEX: 24.55 KG/M2 | RESPIRATION RATE: 20 BRPM

## 2017-10-18 DIAGNOSIS — R41.840 DIFFICULTY CONCENTRATING: ICD-10-CM

## 2017-10-18 DIAGNOSIS — F33.1 MAJOR DEPRESSIVE DISORDER, RECURRENT EPISODE, MODERATE (HCC): ICD-10-CM

## 2017-10-18 DIAGNOSIS — C80.1 NEUROPATHY ASSOCIATED WITH CANCER (HCC): ICD-10-CM

## 2017-10-18 DIAGNOSIS — C62.11 MALIGNANT NEOPLASM OF DESCENDED RIGHT TESTIS (HCC): Primary | ICD-10-CM

## 2017-10-18 DIAGNOSIS — G63 NEUROPATHY ASSOCIATED WITH CANCER (HCC): ICD-10-CM

## 2017-10-18 DIAGNOSIS — F41.9 ANXIETY: ICD-10-CM

## 2017-10-18 PROBLEM — C78.00 MALIGNANT NEOPLASM METASTATIC TO LUNG (HCC): Status: RESOLVED | Noted: 2017-07-14 | Resolved: 2017-10-18

## 2017-10-18 NOTE — PROGRESS NOTES
[address] [facility] Oncology at [address] [place] [address]  [phone]    Hematology / Oncology Follow Up    Reason for Visit:   Kalin Henson is a 28 y.o. male who is seen for follow up of testicular cancer. Treatment History:    · Presented with right testicular mass  · US scrotum 6/4/2015: 9.3 cm mixed solid more than cystic mass replaces the normal architecture of the right testicle. Normal sonographic appearance of the left testicle. · CTA Chest 6/4/2015: Bilateral lung masses  · CT A/P 6/4/2015: Massive retroperitoneal adenopathy, large right testicular mass (9cm)  · MRI Brain 6/4/2015: no intracranial metastatic disease  · Preoperative tumor Markers 6/4/2015: , .5, BHCG [id number]  · Right orchiectomy 6/9/2015 with Dr. 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 Agus Fernandez at [facility] lymph nodes for evaluation; Ifzfj-qrgxh-iipxx-metastatic mature teratoma 2.2 cm, multiple necrotic nodules without residual viable germ cell tumor identified; para-aortic-metastatic mature teratoma 0.9 cm, multiple necrotic nodules without residual viable germ cell tumor identified; Spermatic cord (right)-fibrovascular soft tissue, no germ cell tumor identified; Right pelvic- 0/2 nodes involved, no necrotic nodules or germ cell tumor identified. · Thoracic surgery 11/7/2015 by Dr. Alvin Mcgrath at [facility] 1036 lobectomy, LLL wedge resection and left superior segment wedge resection: Necrotic nodules with surrounding histiocytic reaction and focal squaumous epithelium. No definitive viable malignant cells identified, findings consistent with treatment effect and complete response to chemotherapy. History of Present Illness:   Doing pretty well. Energy fair, not great.   He quit his job and has started school full time. Following with psychiatry. Mood doing fairly well right now, improved with medications. No problems with urinating. Normal bowel movements. Some very mild persistent neuropathy in hands. PAST HISTORY: The following sections were reviewed and updated in the EMR as appropriate: PMH, SH, FH, Medications, Allergies. No Known Allergies     Review of Systems: A complete review of systems was obtained, reviewed, and scanned into the EMR. Pertinent findings reviewed above. Physical Exam:     Visit Vitals    Wt 162 lb (73.5 kg)    BMI 24.63 kg/m2     ECOG PS: 0  General: No distress  Eyes: PERRLA, anicteric sclerae  HENT: Atraumatic, OP clear  Neck: Supple  Lymphatic: No cervical, supraclavicular, or inguinal adenopathy  Respiratory: CTAB, normal respiratory effort  CV: Regular rate and rhythm, no murmurs, no peripheral edema. GI: Soft, nontender, nondistended, no masses, no hepatomegaly, no splenomegaly  MS: Normal gait and station. Digits without clubbing or cyanosis. Skin: No rash, ecchymosis or petechiae. Normal color, turgor, texture. Psych: Alert, oriented, appropriate affect, normal judgment/insight    Results:     Lab Results   Component Value Date/Time    WBC 4.1 06/27/2016 11:33 AM    HGB 13.6 06/27/2016 11:33 AM    HCT 40.7 06/27/2016 11:33 AM    PLATELET 830 76/16/3664 11:33 AM    MCV 85 06/27/2016 11:33 AM    ABS.  NEUTROPHILS 1.6 06/27/2016 11:33 AM    Hemoglobin (POC) 10.9 08/24/2015 08:15 AM    Hematocrit (POC) 32 08/24/2015 08:15 AM     Lab Results   Component Value Date/Time    Sodium 141 06/27/2016 11:33 AM    Potassium 4.9 06/27/2016 11:33 AM    Chloride 103 06/27/2016 11:33 AM    CO2 24 06/27/2016 11:33 AM    Glucose 91 06/27/2016 11:33 AM    BUN 18 06/27/2016 11:33 AM    Creatinine 1.01 06/27/2016 11:33 AM    GFR est  06/27/2016 11:33 AM    GFR est non- 06/27/2016 11:33 AM    Calcium 9.0 06/27/2016 11:33 AM    Sodium (POC) 138 08/24/2015 08:15 AM    Potassium (POC) 3.9 08/24/2015 08:15 AM    Chloride (POC) 101 08/24/2015 08:15 AM    Glucose (POC) 93 08/24/2015 08:15 AM    BUN (POC) 15 08/24/2015 08:15 AM    Creatinine (POC) 0.8 08/24/2015 08:15 AM    Calcium, ionized (POC) 1.24 08/24/2015 08:15 AM     Lab Results   Component Value Date/Time    Bilirubin, total 0.6 06/27/2016 11:33 AM    ALT (SGPT) 37 06/27/2016 11:33 AM    AST (SGOT) 32 06/27/2016 11:33 AM    Alk. phosphatase 77 06/27/2016 11:33 AM    Protein, total 6.6 06/27/2016 11:33 AM    Albumin 4.2 06/27/2016 11:33 AM    Globulin 3.8 12/21/2015 11:24 AM     LDH:  Recent Labs      10/11/17   1119  07/06/17   1405  04/13/17   1503  01/19/17   1538   LDH  142  156  150  170     Beta-HCG:  Recent Labs      10/11/17   1119  07/06/17   1405  04/13/17   1503  01/19/17   1538   [id number]  <1  <1  <1  <1     AFP:  Recent Labs      10/11/17   1119  07/06/17   1405  04/13/17   1503  01/19/17   1538   AFP  1.5  2.0  2.2  2.2         CT C/A/P 10/9/2017: no evidence of recurrence    Assessment:   1) Testicular Cancer (Non-seminoma)  Stage IIIC  He had disease involving his testicle, retroperitoneum, and lungs. Poor risk disease based on pretreatment BHCG of 219k, which translates to a 5 year OS of about 50%. He has been managed with curative intent. He is s/p chemotherapy with 4 cycles of BEP, followed by RPLND and bilateral VATS to resect residual teratoma. He has no evidence of disease recurrence at this time, based on history, exam, labwork, and recent CT scan. He is now about 2 years out from the end of his treatment. We will reduce the frequency of his visits to 6 months, with imaging yearly. Include CT Chest given prior teratoma in lungs. 2) Poor concentration  Improving since starting medications for mood    3) Anxiety/Depression  On medications from psychiatry which are helping. Seeing counselor and psychiatry. 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 Mild, steadily improving.     Plan: · Labs in 6 months: AFP, beta-HCG, LD ([facility])  · CT C/A/P in 12 months  · Return to clinic in 6 months       Signed By: Taniya Hoyt MD     October 18, 2017

## 2018-01-16 ENCOUNTER — TELEPHONE (OUTPATIENT)
Dept: INTERNAL MEDICINE CLINIC | Age: 28
End: 2018-01-16

## 2018-01-16 DIAGNOSIS — H18.609 KERATOCONUS, UNSPECIFIED LATERALITY: Primary | ICD-10-CM

## 2018-01-16 NOTE — TELEPHONE ENCOUNTER
Ref Request:      [facility] - short pump Dr Chavo Shah   DX: keratoconus  Aka conical cornea  Apt: 01/16/2018 this morning. Fax: [phone]    Insurance has changed from PPO to HMO so patient was unaware he needed a ref.  Pt no longer has Bc/Bs      [employer] complete - [employer] product  [id number]

## 2018-01-16 NOTE — TELEPHONE ENCOUNTER
Insurance updated. Referral obtained and faxed to Dr Braun Many office at [phone]. Auth # [id number]  86 visits  1/16/18-7/16/18.

## 2018-04-09 ENCOUNTER — TELEPHONE (OUTPATIENT)
Dept: ONCOLOGY | Age: 28
End: 2018-04-09

## 2018-04-09 NOTE — TELEPHONE ENCOUNTER
[address] at [place]  [phone]    04/09/18- Phone call placed to pt to remind pt to have labs drawn prior to his follow up appointment with . Pt verbalized understanding.

## 2018-04-15 LAB
AFP-TM SERPL-MCNC: 1.9 NG/ML (ref 0–8.3)
HCG INTACT+B SERPL-ACNC: <1 MIU/ML (ref 0–3)
LDH SERPL-CCNC: 200 IU/L (ref 121–224)

## 2018-04-18 ENCOUNTER — OFFICE VISIT (OUTPATIENT)
Dept: ONCOLOGY | Age: 28
End: 2018-04-18

## 2018-04-18 VITALS
TEMPERATURE: 98.9 F | HEART RATE: 82 BPM | BODY MASS INDEX: 24.71 KG/M2 | DIASTOLIC BLOOD PRESSURE: 89 MMHG | WEIGHT: 163 LBS | SYSTOLIC BLOOD PRESSURE: 142 MMHG | HEIGHT: 68 IN | OXYGEN SATURATION: 99 % | RESPIRATION RATE: 20 BRPM

## 2018-04-18 DIAGNOSIS — F33.1 MAJOR DEPRESSIVE DISORDER, RECURRENT EPISODE, MODERATE (HCC): ICD-10-CM

## 2018-04-18 DIAGNOSIS — C77.2 METASTASIS TO RETROPERITONEAL LYMPH NODE (HCC): ICD-10-CM

## 2018-04-18 DIAGNOSIS — G63 NEUROPATHY ASSOCIATED WITH CANCER (HCC): ICD-10-CM

## 2018-04-18 DIAGNOSIS — C62.11 MALIGNANT NEOPLASM OF DESCENDED RIGHT TESTIS (HCC): Primary | ICD-10-CM

## 2018-04-18 DIAGNOSIS — C78.00 MALIGNANT NEOPLASM METASTATIC TO LUNG, UNSPECIFIED LATERALITY (HCC): ICD-10-CM

## 2018-04-18 DIAGNOSIS — C80.1 NEUROPATHY ASSOCIATED WITH CANCER (HCC): ICD-10-CM

## 2018-04-18 NOTE — PROGRESS NOTES
[facility] [place] at [address], [address] [place] [address]  Brian Resides: [phone]  F: [phone]      Reason for Visit:   Faith Bautista is a 28 y.o. male who is seen for follow up of testicular cancer. Treatment History:    · Presented with right testicular mass  · US scrotum 6/4/2015: 9.3 cm mixed solid more than cystic mass replaces the normal architecture of the right testicle. Normal sonographic appearance of the left testicle. · CTA Chest 6/4/2015: Bilateral lung masses  · CT A/P 6/4/2015: Massive retroperitoneal adenopathy, large right testicular mass (9cm)  · MRI Brain 6/4/2015: no intracranial metastatic disease  · Preoperative tumor Markers 6/4/2015: , .5, BHCG [id number]  · Right orchiectomy 6/9/2015 with Dr. 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 Dong Katz at [facility] lymph nodes for evaluation; Uipsj-jnsul-qzhur-metastatic mature teratoma 2.2 cm, multiple necrotic nodules without residual viable germ cell tumor identified; para-aortic-metastatic mature teratoma 0.9 cm, multiple necrotic nodules without residual viable germ cell tumor identified; Spermatic cord (right)-fibrovascular soft tissue, no germ cell tumor identified; Right pelvic- 0/2 nodes involved, no necrotic nodules or germ cell tumor identified. · Thoracic surgery 11/7/2015 by Dr. Seth Danielle at [facility] 1036 lobectomy, LLL wedge resection and left superior segment wedge resection: Necrotic nodules with surrounding histiocytic reaction and focal squaumous epithelium. No definitive viable malignant cells identified, findings consistent with treatment effect and complete response to chemotherapy.      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 He is feeling pretty well. Energy \"good. \"  No pain. Normal bowel movements. No dyspnea or cough. No recent infections. Neuropathy is mostly gone. Remains on medication for mood from psych which he feels is working. PAST HISTORY: The following sections were reviewed and updated in the EMR as appropriate: PMH, SH, FH, Medications, Allergies. No Known Allergies     Review of Systems: A complete review of systems was obtained, reviewed, and scanned into the EMR. Pertinent findings reviewed above. Physical Exam:     Visit Vitals    /89 (BP 1 Location: Right arm, BP Patient Position: Sitting)    Pulse 82    Temp 98.9 °F (37.2 °C) (Oral)    Resp 20    Ht 5' 8\" (1.727 m)    Wt 163 lb (73.9 kg)    SpO2 99%    BMI 24.78 kg/m2     ECOG PS: 0  General: No distress  Eyes: PERRLA, anicteric sclerae  HENT: Atraumatic, OP clear  Neck: Supple  Lymphatic: No cervical, supraclavicular, or inguinal adenopathy  Respiratory: CTAB, normal respiratory effort  CV: Regular rate and rhythm, no murmurs, no peripheral edema. GI: Soft, nontender, nondistended, no masses, no hepatomegaly, no splenomegaly  MS: Normal gait and station. Digits without clubbing or cyanosis. Skin: No rash, ecchymosis or petechiae. Normal color, turgor, texture. Psych: Alert, oriented, appropriate affect, normal judgment/insight    Results:     Lab Results   Component Value Date/Time    WBC 4.1 06/27/2016 11:33 AM    HGB 13.6 06/27/2016 11:33 AM    HCT 40.7 06/27/2016 11:33 AM    PLATELET 700 94/65/8044 11:33 AM    MCV 85 06/27/2016 11:33 AM    ABS.  NEUTROPHILS 1.6 06/27/2016 11:33 AM    Hemoglobin (POC) 10.9 (L) 08/24/2015 08:15 AM    Hematocrit (POC) 32 (L) 08/24/2015 08:15 AM     Lab Results   Component Value Date/Time    Sodium 141 06/27/2016 11:33 AM    Potassium 4.9 06/27/2016 11:33 AM    Chloride 103 06/27/2016 11:33 AM    CO2 24 06/27/2016 11:33 AM    Glucose 91 06/27/2016 11:33 AM    BUN 18 06/27/2016 11:33 AM    Creatinine 1.01 06/27/2016 11:33 AM    GFR est  06/27/2016 11:33 AM    GFR est non- 06/27/2016 11:33 AM    Calcium 9.0 06/27/2016 11:33 AM    Sodium (POC) 138 08/24/2015 08:15 AM    Potassium (POC) 3.9 08/24/2015 08:15 AM    Chloride (POC) 101 08/24/2015 08:15 AM    Glucose (POC) 93 08/24/2015 08:15 AM    BUN (POC) 15 08/24/2015 08:15 AM    Creatinine (POC) 0.8 08/24/2015 08:15 AM    Calcium, ionized (POC) 1.24 08/24/2015 08:15 AM     Lab Results   Component Value Date/Time    Bilirubin, total 0.6 06/27/2016 11:33 AM    ALT (SGPT) 37 06/27/2016 11:33 AM    AST (SGOT) 32 06/27/2016 11:33 AM    Alk. phosphatase 77 06/27/2016 11:33 AM    Protein, total 6.6 06/27/2016 11:33 AM    Albumin 4.2 06/27/2016 11:33 AM    Globulin 3.8 12/21/2015 11:24 AM     LDH:  Recent Labs      04/13/18   1255  10/11/17   1119  07/06/17   1405   LDH  200  142  156     Beta-HCG:  Recent Labs      04/13/18   1255  10/11/17   1119  07/06/17   1405   [id number]  <1  <1  <1     AFP:  Recent Labs      04/13/18   1255  10/11/17   1119  07/06/17   1405   AFP  1.9  1.5  2.0         CT C/A/P 10/9/2017: no evidence of recurrence    Assessment:   1) Testicular Cancer (Non-seminoma)  Stage IIIC  He had disease involving his testicle, retroperitoneum, and lungs. Poor risk disease based on pretreatment BHCG of 219k, which translates to a 5 year OS of about 50%. He has been managed with curative intent. He is s/p chemotherapy with 4 cycles of BEP, followed by RPLND and bilateral VATS 11/2015 to resect residual teratoma. He has no evidence of disease recurrence at this time, based on history, exam, labwork. We will continue with surveillance with H&P and labs every 6 months and CT every 12 months. Include CT Chest given prior teratoma in lungs. 2) Poor concentration  Much improved since starting medications for mood. 3) Anxiety/Depression  On medications from psychiatry which are helping.  Seeing counselor and psychiatry. 4) Neuropathy, chemotherapy induced  Seems to have finally resolved.     Plan:     · Labs in 6 months: AFP, beta-HCG, LD, CBC, CMP ([facility])  · CT C/A/P in 6 months  · Return to clinic in 6 months       Signed By: Efrain Phillip MD

## 2018-10-27 ENCOUNTER — HOSPITAL ENCOUNTER (OUTPATIENT)
Dept: CT IMAGING | Age: 28
Discharge: HOME | End: 2018-10-27
Attending: INTERNAL MEDICINE
Payer: MEDICARE

## 2018-10-27 DIAGNOSIS — C62.11 MALIGNANT NEOPLASM OF DESCENDED RIGHT TESTIS (HCC): ICD-10-CM

## 2018-10-27 DIAGNOSIS — C77.2 METASTASIS TO RETROPERITONEAL LYMPH NODE (HCC): ICD-10-CM

## 2018-10-27 DIAGNOSIS — C78.00 MALIGNANT NEOPLASM METASTATIC TO LUNG, UNSPECIFIED LATERALITY (HCC): ICD-10-CM

## 2018-10-27 LAB
AFP-TM SERPL-MCNC: 1.9 NG/ML (ref 0–8.3)
ALBUMIN SERPL-MCNC: 4.5 G/DL (ref 3.5–5.5)
ALBUMIN/GLOB SERPL: 1.8 {RATIO} (ref 1.2–2.2)
ALP SERPL-CCNC: 71 IU/L (ref 39–117)
ALT SERPL-CCNC: 22 IU/L (ref 0–44)
AST SERPL-CCNC: 26 IU/L (ref 0–40)
BASOPHILS # BLD AUTO: 0 X10E3/UL (ref 0–0.2)
BASOPHILS NFR BLD AUTO: 1 %
BILIRUB SERPL-MCNC: 0.6 MG/DL (ref 0–1.2)
BUN SERPL-MCNC: 22 MG/DL (ref 6–20)
BUN/CREAT SERPL: 23 (ref 9–20)
CALCIUM SERPL-MCNC: 9.4 MG/DL (ref 8.7–10.2)
CHLORIDE SERPL-SCNC: 103 MMOL/L (ref 96–106)
CO2 SERPL-SCNC: 30 MMOL/L (ref 20–29)
CREAT SERPL-MCNC: 0.95 MG/DL (ref 0.76–1.27)
EOSINOPHIL # BLD AUTO: 0.1 X10E3/UL (ref 0–0.4)
EOSINOPHIL NFR BLD AUTO: 1 %
ERYTHROCYTE [DISTWIDTH] IN BLOOD BY AUTOMATED COUNT: 13.4 % (ref 12.3–15.4)
GLOBULIN SER CALC-MCNC: 2.5 G/DL (ref 1.5–4.5)
GLUCOSE SERPL-MCNC: 86 MG/DL (ref 65–99)
HCG INTACT+B SERPL-ACNC: <1 MIU/ML (ref 0–3)
HCT VFR BLD AUTO: 43.2 % (ref 37.5–51)
HGB BLD-MCNC: 14.3 G/DL (ref 13–17.7)
IMM GRANULOCYTES # BLD: 0 X10E3/UL (ref 0–0.1)
IMM GRANULOCYTES NFR BLD: 0 %
LDH SERPL-CCNC: 162 IU/L (ref 121–224)
LYMPHOCYTES # BLD AUTO: 2.4 X10E3/UL (ref 0.7–3.1)
LYMPHOCYTES NFR BLD AUTO: 41 %
MCH RBC QN AUTO: 28.7 PG (ref 26.6–33)
MCHC RBC AUTO-ENTMCNC: 33.1 G/DL (ref 31.5–35.7)
MCV RBC AUTO: 87 FL (ref 79–97)
MONOCYTES # BLD AUTO: 0.4 X10E3/UL (ref 0.1–0.9)
MONOCYTES NFR BLD AUTO: 6 %
NEUTROPHILS # BLD AUTO: 3 X10E3/UL (ref 1.4–7)
NEUTROPHILS NFR BLD AUTO: 51 %
PLATELET # BLD AUTO: 246 X10E3/UL (ref 150–379)
POTASSIUM SERPL-SCNC: 4.9 MMOL/L (ref 3.5–5.2)
PROT SERPL-MCNC: 7 G/DL (ref 6–8.5)
RBC # BLD AUTO: 4.98 X10E6/UL (ref 4.14–5.8)
SODIUM SERPL-SCNC: 143 MMOL/L (ref 134–144)
WBC # BLD AUTO: 5.9 X10E3/UL (ref 3.4–10.8)

## 2018-10-27 PROCEDURE — 74011636320 HC RX REV CODE- 636/320: Performed by: RADIOLOGY

## 2018-10-27 PROCEDURE — 74177 CT ABD & PELVIS W/CONTRAST: CPT

## 2018-10-27 RX ADMIN — IOPAMIDOL 100 ML: 755 INJECTION, SOLUTION INTRAVENOUS at 09:06

## 2018-10-27 NOTE — PROGRESS NOTES
[facility] [place] at [address], [address] [facility] [address] [facility] Imelda Dodsongh: [phone]  F: [phone]      Reason for Visit:   Angel Richardson is a 33 y.o. male who is seen for follow up of testicular cancer. Treatment History:    · Presented with right testicular mass  · US scrotum 6/4/2015: 9.3 cm mixed solid more than cystic mass replaces the normal architecture of the right testicle. Normal sonographic appearance of the left testicle. · CTA Chest 6/4/2015: Bilateral lung masses  · CT A/P 6/4/2015: Massive retroperitoneal adenopathy, large right testicular mass (9cm)  · MRI Brain 6/4/2015: no intracranial metastatic disease  · Preoperative tumor Markers 6/4/2015: , .5, BHCG [id number]  · Right orchiectomy 6/9/2015 with Dr. 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 Chhaya Huff at [facility] lymph nodes for evaluation; Keyts-ffilw-jergd-metastatic mature teratoma 2.2 cm, multiple necrotic nodules without residual viable germ cell tumor identified; para-aortic-metastatic mature teratoma 0.9 cm, multiple necrotic nodules without residual viable germ cell tumor identified; Spermatic cord (right)-fibrovascular soft tissue, no germ cell tumor identified; Right pelvic- 0/2 nodes involved, no necrotic nodules or germ cell tumor identified. · Thoracic surgery 11/7/2015 by Dr. Chang Mccall at [facility] 1036 lobectomy, LLL wedge resection and left superior segment wedge resection: Necrotic nodules with surrounding histiocytic reaction and focal squaumous epithelium. No definitive viable malignant cells identified, findings consistent with treatment effect and complete response to chemotherapy. History of Present Illness:   He reports feeling well. No cough. No significant dyspnea. Exercising regularly, runs 20-30 minutes without difficulty. No pain. Neuropathy seems to have resolved. PAST HISTORY: The following sections were reviewed and updated in the EMR as appropriate: PMH, SH, FH, Medications, Allergies. No Known Allergies     Review of Systems: A complete review of systems was obtained, reviewed, and scanned into the EMR. Pertinent findings reviewed above. Physical Exam:     Visit Vitals  /82 (BP 1 Location: Right arm, BP Patient Position: Sitting)   Pulse 73   Temp 98.8 °F (37.1 °C) (Oral)   Resp 14   Ht 5' 8\" (1.727 m)   Wt 139 lb (63 kg)   SpO2 98%   BMI 21.13 kg/m²     ECOG PS: 0  General: No distress  Eyes: PERRLA, anicteric sclerae  HENT: Atraumatic, OP clear  Neck: Supple  Lymphatic: No cervical, supraclavicular, or inguinal adenopathy  Respiratory: CTAB, normal respiratory effort  CV: Regular rate and rhythm, no murmurs, no peripheral edema. GI: Soft, nontender, nondistended, no masses, no hepatomegaly, no splenomegaly  MS: Normal gait and station. Digits without clubbing or cyanosis. Skin: No rash, ecchymosis or petechiae. Normal color, turgor, texture. Psych: Alert, oriented, appropriate affect, normal judgment/insight    Results:     Lab Results   Component Value Date/Time    WBC 5.9 10/26/2018 03:55 PM    HGB 14.3 10/26/2018 03:55 PM    HCT 43.2 10/26/2018 03:55 PM    PLATELET 244 20/59/6055 03:55 PM    MCV 87 10/26/2018 03:55 PM    ABS.  NEUTROPHILS 3.0 10/26/2018 03:55 PM    Hemoglobin (POC) 10.9 (L) 08/24/2015 08:15 AM    Hematocrit (POC) 32 (L) 08/24/2015 08:15 AM     Lab Results   Component Value Date/Time    Sodium 143 10/26/2018 03:55 PM    Potassium 4.9 10/26/2018 03:55 PM    Chloride 103 10/26/2018 03:55 PM    CO2 30 (H) 10/26/2018 03:55 PM    Glucose 86 10/26/2018 03:55 PM    BUN 22 (H) 10/26/2018 03:55 PM    Creatinine 0.95 10/26/2018 03:55 PM    GFR est  10/26/2018 03:55 PM    GFR est non- 10/26/2018 03:55 PM    Calcium 9.4 10/26/2018 03:55 PM    Sodium (POC) 138 08/24/2015 08:15 AM    Potassium (POC) 3.9 08/24/2015 08:15 AM    Chloride (POC) 101 08/24/2015 08:15 AM    Glucose (POC) 93 08/24/2015 08:15 AM    BUN (POC) 15 08/24/2015 08:15 AM    Creatinine (POC) 0.8 08/24/2015 08:15 AM    Calcium, ionized (POC) 1.24 08/24/2015 08:15 AM     Lab Results   Component Value Date/Time    Bilirubin, total 0.6 10/26/2018 03:55 PM    ALT (SGPT) 22 10/26/2018 03:55 PM    AST (SGOT) 26 10/26/2018 03:55 PM    Alk. phosphatase 71 10/26/2018 03:55 PM    Protein, total 7.0 10/26/2018 03:55 PM    Albumin 4.5 10/26/2018 03:55 PM    Globulin 3.8 12/21/2015 11:24 AM     LDH:  Recent Labs     10/26/18  1555 04/13/18  1255    200     Beta-HCG:  Recent Labs     10/26/18  1555 04/13/18  1255   [id number] <1 <1     AFP:  Recent Labs     10/26/18  1555 04/13/18  1255   AFP 1.9 1.9         CT C/A/P 10/9/2017: no evidence of recurrence    CT C/A/P 10/27/2018: There is a new trace left pleural effusion with underlying atelectasis. Stable   pleural thickening right lung base. Stable postoperative changes. Stable 4 mm   nodular density left apex. No evidence of metastatic disease in the abdomen or   pelvis. Assessment:   1) Testicular Cancer (Non-seminoma)  Stage IIIC  He had disease involving his testicle, retroperitoneum, and lungs. Poor risk disease based on pretreatment BHCG of 219k, which translates to a 5 year OS of about 50%. He has been managed with curative intent. He is s/p chemotherapy with 4 cycles of BEP, followed by RPLND and bilateral VATS 11/2015 to resect residual teratoma. He has no evidence of disease recurrence at this time, based on history, exam, labwork. CT looks good as well, other than some chronic lung changes which I suspect are related to his prior surgery. We will continue with surveillance with H&P and labs every 6 months.   I will continue with CT every 12 months for now as well, given the prior teratoma in his abdomen and lungs. 2) Poor concentration  Improved since starting medications for mood. 3) Anxiety/Depression  On medications from psychiatry which are helping. Seeing counselor and psychiatry.     4) Neuropathy, chemotherapy induced  Resolved now    Plan:     · Labs in 6 months: AFP, beta-HCG, LD ([facility])  · CT C/A/P in 12 months  · Return to clinic in 6 months       Signed By: Mayank Vaelncia MD

## 2018-10-30 ENCOUNTER — OFFICE VISIT (OUTPATIENT)
Dept: ONCOLOGY | Age: 28
End: 2018-10-30

## 2018-10-30 VITALS
HEART RATE: 73 BPM | SYSTOLIC BLOOD PRESSURE: 137 MMHG | TEMPERATURE: 98.8 F | BODY MASS INDEX: 21.07 KG/M2 | HEIGHT: 68 IN | DIASTOLIC BLOOD PRESSURE: 82 MMHG | WEIGHT: 139 LBS | RESPIRATION RATE: 14 BRPM | OXYGEN SATURATION: 98 %

## 2018-10-30 DIAGNOSIS — C62.11 MALIGNANT NEOPLASM OF DESCENDED RIGHT TESTIS (HCC): Primary | ICD-10-CM

## 2018-10-30 DIAGNOSIS — C78.00 MALIGNANT NEOPLASM METASTATIC TO LUNG, UNSPECIFIED LATERALITY (HCC): ICD-10-CM

## 2019-04-12 ENCOUNTER — TELEPHONE (OUTPATIENT)
Dept: ONCOLOGY | Age: 29
End: 2019-04-12

## 2019-04-12 NOTE — TELEPHONE ENCOUNTER
Spoke to patient's mother. States she has concerns regarding her son. Feels that he is depressed. \"He has no ron in life\". He is working full time as a  and is not making enough money to support himself. He is still living at home, which is fine with her, but she recognizes that he would be happier on his own. He was previously seeing a counselor and psychiatrist but has stopped seeing them due to insurance changes. No longer on medication to manage depression as he didn't feel that they were helping. He tried to go back to school, but was too overwhelmed by it. Feels that he is \"overwhelmed by life\". He does \"way less than most working [employer] and it is too much for him to think of adding anything else on. She has suggested support groups, books about depression or encouraged him to find time for friends, all of which he feels is too much to go. Little motivation to do these things. He has little energy and has a very narrow routine, patient states he feels that it would be too difficult to make an appointment to see a new counselor as it is enough for him with just work commitments. Mother is hoping depression can be addressed at upcoming appointment on 4/30/19. Offered support of social work following the appointment as well to address some of these concerns. She appreciates our support. Encouraged her to call with any further concerns.

## 2019-04-23 ENCOUNTER — TELEPHONE (OUTPATIENT)
Dept: ONCOLOGY | Age: 29
End: 2019-04-23

## 2019-04-23 NOTE — TELEPHONE ENCOUNTER
[address] at [place] [address]  [phone]    04/23/19- Phone call placed to pt to remind pt to have labs drawn prior to his follow up appointment with .  left for patient.

## 2019-04-25 NOTE — PROGRESS NOTES
[facility] [place] at [facility]  [address] [facility], [address]  Denver Hoffmann: [phone]  F: [phone]      Reason for Visit:   Boni Becerra is a 26 y.o. male who is seen for follow up of testicular cancer. Treatment History:    · Presented with right testicular mass  · US scrotum 6/4/2015: 9.3 cm mixed solid more than cystic mass replaces the normal architecture of the right testicle. Normal sonographic appearance of the left testicle. · CTA Chest 6/4/2015: Bilateral lung masses  · CT A/P 6/4/2015: Massive retroperitoneal adenopathy, large right testicular mass (9cm)  · MRI Brain 6/4/2015: no intracranial metastatic disease  · Preoperative tumor Markers 6/4/2015: , .5, BHCG [id number]  · Right orchiectomy 6/9/2015 with Dr. 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 Raúl Huitron at [facility] lymph nodes for evaluation; Hccrz-inris-ydofw-metastatic mature teratoma 2.2 cm, multiple necrotic nodules without residual viable germ cell tumor identified; para-aortic-metastatic mature teratoma 0.9 cm, multiple necrotic nodules without residual viable germ cell tumor identified; Spermatic cord (right)-fibrovascular soft tissue, no germ cell tumor identified; Right pelvic- 0/2 nodes involved, no necrotic nodules or germ cell tumor identified. · Thoracic surgery 11/7/2015 by Dr. Morelia Amaral at [facility] 1036 lobectomy, LLL wedge resection and left superior segment wedge resection: Necrotic nodules with surrounding histiocytic reaction and focal squaumous epithelium. No definitive viable malignant cells identified, findings consistent with treatment effect and complete response to chemotherapy. History of Present Illness:   He reports doing well.   No recent infection. No dyspnea or cough. Gets a lot of walking in at work. Not weight lifting as much now. No longer taking medications for his mood, or seeing psychiatry. His mother had called here to express concerns about his mood, but he denies any issues. PAST HISTORY: The following sections were reviewed and updated in the EMR as appropriate: PMH, SH, FH, Medications, Allergies. No Known Allergies     Review of Systems: A complete review of systems was obtained, reviewed, and scanned into the EMR. Pertinent findings reviewed above. Physical Exam:     Visit Vitals  /74 (BP 1 Location: Left arm, BP Patient Position: Sitting)   Pulse 66   Temp 97.8 °F (36.6 °C) (Temporal)   Resp 20   Ht 5' 8\" (1.727 m)   Wt 150 lb (68 kg)   SpO2 99%   BMI 22.81 kg/m²     ECOG PS: 0  General: No distress  Eyes: PERRLA, anicteric sclerae  HENT: Atraumatic, OP clear  Neck: Supple  Lymphatic: No cervical, supraclavicular, or inguinal adenopathy  Respiratory: CTAB, normal respiratory effort  CV: Regular rate and rhythm, no murmurs, no peripheral edema. GI: Soft, nontender, nondistended, no masses, no hepatomegaly, no splenomegaly  MS: Normal gait and station. Digits without clubbing or cyanosis. Skin: No rash, ecchymosis or petechiae. Normal color, turgor, texture. Psych: Alert, oriented, appropriate affect, normal judgment/insight    Results:     Lab Results   Component Value Date/Time    WBC 5.9 10/26/2018 03:55 PM    HGB 14.3 10/26/2018 03:55 PM    HCT 43.2 10/26/2018 03:55 PM    PLATELET 539 85/16/3854 03:55 PM    MCV 87 10/26/2018 03:55 PM    ABS.  NEUTROPHILS 3.0 10/26/2018 03:55 PM    Hemoglobin (POC) 10.9 (L) 08/24/2015 08:15 AM    Hematocrit (POC) 32 (L) 08/24/2015 08:15 AM     Lab Results   Component Value Date/Time    Sodium 143 10/26/2018 03:55 PM    Potassium 4.9 10/26/2018 03:55 PM    Chloride 103 10/26/2018 03:55 PM    CO2 30 (H) 10/26/2018 03:55 PM    Glucose 86 10/26/2018 03:55 PM    BUN 22 (H) 10/26/2018 03:55 PM    Creatinine 0.95 10/26/2018 03:55 PM    GFR est  10/26/2018 03:55 PM    GFR est non- 10/26/2018 03:55 PM    Calcium 9.4 10/26/2018 03:55 PM    Sodium (POC) 138 08/24/2015 08:15 AM    Potassium (POC) 3.9 08/24/2015 08:15 AM    Chloride (POC) 101 08/24/2015 08:15 AM    Glucose (POC) 93 08/24/2015 08:15 AM    BUN (POC) 15 08/24/2015 08:15 AM    Creatinine (POC) 0.8 08/24/2015 08:15 AM    Calcium, ionized (POC) 1.24 08/24/2015 08:15 AM     Lab Results   Component Value Date/Time    Bilirubin, total 0.6 10/26/2018 03:55 PM    ALT (SGPT) 22 10/26/2018 03:55 PM    AST (SGOT) 26 10/26/2018 03:55 PM    Alk. phosphatase 71 10/26/2018 03:55 PM    Protein, total 7.0 10/26/2018 03:55 PM    Albumin 4.5 10/26/2018 03:55 PM    Globulin 3.8 12/21/2015 11:24 AM     LDH:  Recent Labs     04/26/19  1534 10/26/18  1555    162     Beta-HCG:  Recent Labs     04/26/19  1534 10/26/18  1555   [id number] <1 <1     AFP:  Recent Labs     04/26/19  1534 10/26/18  1555   AFP 1.8 1.9         CT C/A/P 10/9/2017: no evidence of recurrence    CT C/A/P 10/27/2018: There is a new trace left pleural effusion with underlying atelectasis. Stable   pleural thickening right lung base. Stable postoperative changes. Stable 4 mm   nodular density left apex. No evidence of metastatic disease in the abdomen or   pelvis. Assessment:   1) Testicular Cancer (Non-seminoma)  Stage IIIC  He had disease involving his testicle, retroperitoneum, and lungs. Poor risk disease based on pretreatment BHCG of 219k, which translates to a 5 year OS of about 50%. He has been managed with curative intent. He is s/p chemotherapy with 4 cycles of BEP, followed by RPLND and bilateral VATS 11/2015 to resect residual teratoma. He has no evidence of disease recurrence at this time, based on history, exam, labwork. We will continue with surveillance with H&P and labs every 6 months.   I will continue with CT every 12 months for now as well, given the prior teratoma in his abdomen and lungs. 2) Poor concentration  Overall improved. Off medications now. 3) Depression  He feels this is better, off medications and no longer following with counselor or psychiatry. His mother called us earlier and expressed concerns, which I reviewed with him today. He feels like he is just tired at the end of his long work days, denies depression. I offered to help get him plugged back in with psychiatry and/or psychology if he desires in the future, and he will think about this. Check TSH and testosterone with next lab draw.       Plan:     · Labs in 6 months: AFP, beta-HCG, LD, testosterone, TSH ([facility])  · CT C/A/P in 6 months  · Return to clinic in 6 months       Signed By: Gustavo Pack MD

## 2019-04-27 LAB
AFP-TM SERPL-MCNC: 1.8 NG/ML (ref 0–8.3)
HCG INTACT+B SERPL-ACNC: <1 MIU/ML (ref 0–3)
LDH SERPL-CCNC: 164 IU/L (ref 121–224)

## 2019-04-30 ENCOUNTER — OFFICE VISIT (OUTPATIENT)
Dept: ONCOLOGY | Age: 29
End: 2019-04-30

## 2019-04-30 VITALS
HEIGHT: 68 IN | DIASTOLIC BLOOD PRESSURE: 74 MMHG | OXYGEN SATURATION: 99 % | HEART RATE: 66 BPM | RESPIRATION RATE: 20 BRPM | TEMPERATURE: 97.8 F | BODY MASS INDEX: 22.73 KG/M2 | SYSTOLIC BLOOD PRESSURE: 129 MMHG | WEIGHT: 150 LBS

## 2019-04-30 DIAGNOSIS — C77.2 METASTASIS TO RETROPERITONEAL LYMPH NODE (HCC): ICD-10-CM

## 2019-04-30 DIAGNOSIS — C78.00 MALIGNANT NEOPLASM METASTATIC TO LUNG, UNSPECIFIED LATERALITY (HCC): ICD-10-CM

## 2019-04-30 DIAGNOSIS — C62.11 MALIGNANT NEOPLASM OF DESCENDED RIGHT TESTIS (HCC): Primary | ICD-10-CM

## 2019-04-30 DIAGNOSIS — F33.1 MAJOR DEPRESSIVE DISORDER, RECURRENT EPISODE, MODERATE (HCC): ICD-10-CM

## 2019-04-30 NOTE — PROGRESS NOTES
Katty Lamb is a 26 y.o. male follow up for testicular cancer. 1. Have you been to the ER, urgent care clinic since your last visit? Hospitalized since your last visit? No     2. Have you seen or consulted any other health care providers outside of the [address] [place] [address] since your last visit? Include any pap smears or colon screening.  No

## 2019-10-21 ENCOUNTER — TELEPHONE (OUTPATIENT)
Dept: ONCOLOGY | Age: 29
End: 2019-10-21

## 2019-10-21 NOTE — TELEPHONE ENCOUNTER
[address] at [place]  [phone]    10/21/19- Phone call placed to pt to remind pt to have labs drawn prior to his follow up appointment with .  left for pateint.

## 2019-10-25 ENCOUNTER — HOSPITAL ENCOUNTER (OUTPATIENT)
Dept: CT IMAGING | Age: 29
Discharge: HOME | End: 2019-10-25
Attending: INTERNAL MEDICINE
Payer: COMMERCIAL

## 2019-10-25 DIAGNOSIS — C77.2 METASTASIS TO RETROPERITONEAL LYMPH NODE (HCC): ICD-10-CM

## 2019-10-25 DIAGNOSIS — C78.00 MALIGNANT NEOPLASM METASTATIC TO LUNG, UNSPECIFIED LATERALITY (HCC): ICD-10-CM

## 2019-10-25 DIAGNOSIS — C62.11 MALIGNANT NEOPLASM OF DESCENDED RIGHT TESTIS (HCC): ICD-10-CM

## 2019-10-25 PROCEDURE — 74177 CT ABD & PELVIS W/CONTRAST: CPT

## 2019-10-25 PROCEDURE — 74011636320 HC RX REV CODE- 636/320: Performed by: RADIOLOGY

## 2019-10-25 RX ADMIN — IOPAMIDOL 100 ML: 755 INJECTION, SOLUTION INTRAVENOUS at 08:51

## 2019-10-25 NOTE — PROGRESS NOTES
Cancer [place] at [address], [address] [facility] 1007 [facility]  Yokasta Elliott: [phone]  F: [phone]      Reason for Visit:   Josh Armando is a 26 y.o. male who is seen for follow up of testicular cancer. Treatment History:    · Presented with right testicular mass  · US scrotum 6/4/2015: 9.3 cm mixed solid more than cystic mass replaces the normal architecture of the right testicle. Normal sonographic appearance of the left testicle. · CTA Chest 6/4/2015: Bilateral lung masses  · CT A/P 6/4/2015: Massive retroperitoneal adenopathy, large right testicular mass (9cm)  · MRI Brain 6/4/2015: no intracranial metastatic disease  · Preoperative tumor Markers 6/4/2015: , .5, BHCG [id number]  · Right orchiectomy 6/9/2015 with Dr. 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 Shyam Catalan at [facility] lymph nodes for evaluation; Ypbjy-lhdyl-urdgp-metastatic mature teratoma 2.2 cm, multiple necrotic nodules without residual viable germ cell tumor identified; para-aortic-metastatic mature teratoma 0.9 cm, multiple necrotic nodules without residual viable germ cell tumor identified; Spermatic cord (right)-fibrovascular soft tissue, no germ cell tumor identified; Right pelvic- 0/2 nodes involved, no necrotic nodules or germ cell tumor identified. · Thoracic surgery 11/7/2015 by Dr. Magno Joyner at [facility] 1036 lobectomy, LLL wedge resection and left superior segment wedge resection: Necrotic nodules with surrounding histiocytic reaction and focal squaumous epithelium. No definitive viable malignant cells identified, findings consistent with treatment effect and complete response to chemotherapy. History of Present Illness:   He continues with some fatigue. Perhaps a bit better since last here. He feels like he pushes himself too hard and doesn't get as much rest as he should. Gets through work day ok, but is tired in the afternoons. No dyspnea or cough. No abdominal pain. PAST HISTORY: The following sections were reviewed and updated in the EMR as appropriate: PMH, SH, FH, Medications, Allergies. No Known Allergies     Review of Systems: A complete review of systems was obtained, reviewed, and scanned into the EMR. Pertinent findings reviewed above. Physical Exam:     Visit Vitals  /72 (BP 1 Location: Right arm, BP Patient Position: Sitting)   Pulse 61   Temp 97 °F (36.1 °C) (Temporal)   Resp 18   Ht 5' 8\" (1.727 m)   Wt 150 lb (68 kg)   SpO2 99%   BMI 22.81 kg/m²     ECOG PS: 0  General: No distress  Eyes: PERRLA, anicteric sclerae  HENT: Atraumatic, OP clear  Neck: Supple  Lymphatic: No cervical, supraclavicular, or inguinal adenopathy  Respiratory: CTAB, normal respiratory effort  CV: Regular rate and rhythm, no murmurs, no peripheral edema. GI: Soft, nontender, nondistended, no masses, no hepatomegaly, no splenomegaly  MS: Normal gait and station. Digits without clubbing or cyanosis. Skin: No rash, ecchymosis or petechiae. Normal color, turgor, texture. Psych: Alert, oriented, appropriate affect, normal judgment/insight    Results:     Lab Results   Component Value Date/Time    WBC 7.0 10/23/2019 03:42 PM    HGB 13.6 10/23/2019 03:42 PM    HCT 42.1 10/23/2019 03:42 PM    PLATELET 505 99/51/3773 03:42 PM    MCV 86 10/23/2019 03:42 PM    ABS.  NEUTROPHILS 3.7 10/23/2019 03:42 PM    Hemoglobin (POC) 10.9 (L) 08/24/2015 08:15 AM    Hematocrit (POC) 32 (L) 08/24/2015 08:15 AM     Lab Results   Component Value Date/Time    Sodium 140 10/23/2019 03:42 PM    Potassium 4.6 10/23/2019 03:42 PM    Chloride 101 10/23/2019 03:42 PM    CO2 27 10/23/2019 03:42 PM    Glucose 85 10/23/2019 03:42 PM    BUN 17 10/23/2019 03:42 PM    Creatinine 0.79 10/23/2019 03:42 PM    GFR est  10/23/2019 03:42 PM    GFR est non- 10/23/2019 03:42 PM    Calcium 9.6 10/23/2019 03:42 PM    Sodium (POC) 138 08/24/2015 08:15 AM    Potassium (POC) 3.9 08/24/2015 08:15 AM    Chloride (POC) 101 08/24/2015 08:15 AM    Glucose (POC) 93 08/24/2015 08:15 AM    BUN (POC) 15 08/24/2015 08:15 AM    Creatinine (POC) 0.8 08/24/2015 08:15 AM    Calcium, ionized (POC) 1.24 08/24/2015 08:15 AM     Lab Results   Component Value Date/Time    Bilirubin, total 0.5 10/23/2019 03:42 PM    ALT (SGPT) 17 10/23/2019 03:42 PM    AST (SGOT) 24 10/23/2019 03:42 PM    Alk. phosphatase 76 10/23/2019 03:42 PM    Protein, total 6.9 10/23/2019 03:42 PM    Albumin 4.5 10/23/2019 03:42 PM    Globulin 3.8 12/21/2015 11:24 AM     LDH:  Recent Labs     10/23/19  1542 04/26/19  1534    164     Beta-HCG:  Recent Labs     10/23/19  1542 04/26/19  1534   [id number] <1 <1     AFP:  Recent Labs     10/23/19  1542 04/26/19  1534   AFP 1.5 1.8       10/23/2019  Total testosterone: 544  Free testosterone: 6.8 (L)      CT C/A/P 10/9/2017: no evidence of recurrence    CT C/A/P 10/27/2018: There is a new trace left pleural effusion with underlying atelectasis. Stable   pleural thickening right lung base. Stable postoperative changes. Stable 4 mm   nodular density left apex. No evidence of metastatic disease in the abdomen or   pelvis. CT C/A/P 10/25/2019: stable, no evidence of recurrence    Assessment:   1) Testicular Cancer (Non-seminoma)  Stage IIIC  He had disease involving his testicle, retroperitoneum, and lungs. Poor risk disease based on pretreatment BHCG of 219k, which translates to a 5 year OS of about 50%. He has been managed with curative intent. He is s/p chemotherapy with 4 cycles of BEP, followed by RPLND and bilateral VATS 11/2015 to resect residual teratoma. He has no evidence of disease recurrence at this time, based on history, exam, labwork.   We will continue with surveillance with H&P and labs every 6 months for the next year, and then yearly until 10 years out. I will obtain one more CT at the 5 year elizabeth, given the prior teratoma in his abdomen and lungs, and then only as indicated. 2) Poor concentration  Overall improved. Off medications now. 3) Fatigue  Normal TSH and total testosterone. Low free testosterone, but I'm not sure of the significance of this. He wants to focus on exercise and rest and monitor this for now, repeat labs in 6 months. May consider urology referral if not improving.       Plan:     · Labs in 6 months: AFP, beta-HCG, LD, testosterone ([facility])  · CT C/A/P in 12 months  · Return to clinic in 6 months       Signed By: Bret Mera MD

## 2019-10-26 LAB
AFP-TM SERPL-MCNC: 1.5 NG/ML (ref 0–8.3)
ALBUMIN SERPL-MCNC: 4.5 G/DL (ref 3.5–5.5)
ALBUMIN/GLOB SERPL: 1.9 {RATIO} (ref 1.2–2.2)
ALP SERPL-CCNC: 76 IU/L (ref 39–117)
ALT SERPL-CCNC: 17 IU/L (ref 0–44)
AST SERPL-CCNC: 24 IU/L (ref 0–40)
BASOPHILS # BLD AUTO: 0.1 X10E3/UL (ref 0–0.2)
BASOPHILS NFR BLD AUTO: 1 %
BILIRUB SERPL-MCNC: 0.5 MG/DL (ref 0–1.2)
BUN SERPL-MCNC: 17 MG/DL (ref 6–20)
BUN/CREAT SERPL: 22 (ref 9–20)
CALCIUM SERPL-MCNC: 9.6 MG/DL (ref 8.7–10.2)
CHLORIDE SERPL-SCNC: 101 MMOL/L (ref 96–106)
CO2 SERPL-SCNC: 27 MMOL/L (ref 20–29)
CREAT SERPL-MCNC: 0.79 MG/DL (ref 0.76–1.27)
EOSINOPHIL # BLD AUTO: 0.1 X10E3/UL (ref 0–0.4)
EOSINOPHIL NFR BLD AUTO: 1 %
ERYTHROCYTE [DISTWIDTH] IN BLOOD BY AUTOMATED COUNT: 12.2 % (ref 12.3–15.4)
GLOBULIN SER CALC-MCNC: 2.4 G/DL (ref 1.5–4.5)
GLUCOSE SERPL-MCNC: 85 MG/DL (ref 65–99)
HCG INTACT+B SERPL-ACNC: <1 MIU/ML (ref 0–3)
HCT VFR BLD AUTO: 42.1 % (ref 37.5–51)
HGB BLD-MCNC: 13.6 G/DL (ref 13–17.7)
IMM GRANULOCYTES # BLD AUTO: 0 X10E3/UL (ref 0–0.1)
IMM GRANULOCYTES NFR BLD AUTO: 0 %
LDH SERPL-CCNC: 180 IU/L (ref 121–224)
LYMPHOCYTES # BLD AUTO: 2.7 X10E3/UL (ref 0.7–3.1)
LYMPHOCYTES NFR BLD AUTO: 39 %
MCH RBC QN AUTO: 27.9 PG (ref 26.6–33)
MCHC RBC AUTO-ENTMCNC: 32.3 G/DL (ref 31.5–35.7)
MCV RBC AUTO: 86 FL (ref 79–97)
MONOCYTES # BLD AUTO: 0.5 X10E3/UL (ref 0.1–0.9)
MONOCYTES NFR BLD AUTO: 7 %
NEUTROPHILS # BLD AUTO: 3.7 X10E3/UL (ref 1.4–7)
NEUTROPHILS NFR BLD AUTO: 52 %
PLATELET # BLD AUTO: 254 X10E3/UL (ref 150–450)
POTASSIUM SERPL-SCNC: 4.6 MMOL/L (ref 3.5–5.2)
PROT SERPL-MCNC: 6.9 G/DL (ref 6–8.5)
RBC # BLD AUTO: 4.88 X10E6/UL (ref 4.14–5.8)
SODIUM SERPL-SCNC: 140 MMOL/L (ref 134–144)
TESTOST FREE SERPL-MCNC: 6.8 PG/ML (ref 8.7–25.1)
TESTOST SERPL-MCNC: 544 NG/DL (ref 264–916)
TSH SERPL DL<=0.005 MIU/L-ACNC: 3.9 UIU/ML (ref 0.45–4.5)
WBC # BLD AUTO: 7 X10E3/UL (ref 3.4–10.8)

## 2019-10-30 ENCOUNTER — OFFICE VISIT (OUTPATIENT)
Dept: ONCOLOGY | Age: 29
End: 2019-10-30

## 2019-10-30 VITALS
OXYGEN SATURATION: 99 % | HEIGHT: 68 IN | BODY MASS INDEX: 22.73 KG/M2 | SYSTOLIC BLOOD PRESSURE: 129 MMHG | TEMPERATURE: 97 F | RESPIRATION RATE: 18 BRPM | HEART RATE: 61 BPM | WEIGHT: 150 LBS | DIASTOLIC BLOOD PRESSURE: 72 MMHG

## 2019-10-30 DIAGNOSIS — C62.11 MALIGNANT NEOPLASM OF DESCENDED RIGHT TESTIS (HCC): Primary | ICD-10-CM

## 2019-10-30 NOTE — PROGRESS NOTES
Lucy Angulo is a 26 y.o. male follow up for testicular cancer. 1. Have you been to the ER, urgent care clinic since your last visit? Hospitalized since your last visit?no     2. Have you seen or consulted any other health care providers outside of the [address] [place] [address] since your last visit? Include any pap smears or colon screening.  no

## 2020-07-09 ENCOUNTER — TELEPHONE (OUTPATIENT)
Dept: ONCOLOGY | Age: 30
End: 2020-07-09

## 2020-07-15 NOTE — PROGRESS NOTES
Can[facility] [place] at [place] [address] 100[address] [facility]  Mercedez Bras: [phone]  F: [phone]      Reason for Visit:   Griselda Lea is a 31 y.o. male who is seen by synchronous (real-time) audio-video technology for follow up of testicular cancer. Treatment History:    · Presented with right testicular mass  · US scrotum 6/4/2015: 9.3 cm mixed solid more than cystic mass replaces the normal architecture of the right testicle. Normal sonographic appearance of the left testicle. · CTA Chest 6/4/2015: Bilateral lung masses  · CT A/P 6/4/2015: Massive retroperitoneal adenopathy, large right testicular mass (9cm)  · MRI Brain 6/4/2015: no intracranial metastatic disease  · Preoperative tumor Markers 6/4/2015: , .5, BHCG [id number]  · Right orchiectomy 6/9/2015 with Dr. 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 Simón Rose at [facility] lymph nodes for evaluation; Evwda-vjtai-hiapm-metastatic mature teratoma 2.2 cm, multiple necrotic nodules without residual viable germ cell tumor identified; para-aortic-metastatic mature teratoma 0.9 cm, multiple necrotic nodules without residual viable germ cell tumor identified; Spermatic cord (right)-fibrovascular soft tissue, no germ cell tumor identified; Right pelvic- 0/2 nodes involved, no necrotic nodules or germ cell tumor identified. · Thoracic surgery 11/7/2015 by Dr. Francisco Steen at [facility] 1036 lobectomy, LLL wedge resection and left superior segment wedge resection: Necrotic nodules with surrounding histiocytic reaction and focal squaumous epithelium. No definitive viable malignant cells identified, findings consistent with treatment effect and complete response to chemotherapy.      History of Present Illness:   He reports he has been doing fairly well. No pain. Moving bowels normally. Urinating normally. No dyspnea or cough. Weight has been stable. PAST HISTORY: The following sections were reviewed and updated in the EMR as appropriate: PMH, SH, FH, Medications, Allergies. No Known Allergies     Review of Systems: A complete review of systems was obtained, reviewed, and scanned into the EMR. Pertinent findings reviewed above. Physical Exam:     There were no vitals taken for this visit. ECOG PS: 0  The patient had technical difficulties with his forward facing camera on his phone, so physical exam was limited. Due to this being a TeleHealth evaluation ([facility] public [facility] emergency), many elements of the physical examination are unable to be assessed. Evaluation of the following organ systems was limited: Vitals/Constitutional/EENT/Resp/CV/GI//MS/Neuro/Skin/Heme-Lymph-Imm. Results:     Lab Results   Component Value Date/Time    WBC 7.0 10/23/2019 03:42 PM    HGB 13.6 10/23/2019 03:42 PM    HCT 42.1 10/23/2019 03:42 PM    PLATELET 471 61/32/2670 03:42 PM    MCV 86 10/23/2019 03:42 PM    ABS.  NEUTROPHILS 3.7 10/23/2019 03:42 PM    Hemoglobin (POC) 10.9 (L) 08/24/2015 08:15 AM    Hematocrit (POC) 32 (L) 08/24/2015 08:15 AM     Lab Results   Component Value Date/Time    Sodium 140 10/23/2019 03:42 PM    Potassium 4.6 10/23/2019 03:42 PM    Chloride 101 10/23/2019 03:42 PM    CO2 27 10/23/2019 03:42 PM    Glucose 85 10/23/2019 03:42 PM    BUN 17 10/23/2019 03:42 PM    Creatinine 0.79 10/23/2019 03:42 PM    GFR est  10/23/2019 03:42 PM    GFR est non- 10/23/2019 03:42 PM    Calcium 9.6 10/23/2019 03:42 PM    Sodium (POC) 138 08/24/2015 08:15 AM    Potassium (POC) 3.9 08/24/2015 08:15 AM    Chloride (POC) 101 08/24/2015 08:15 AM    Glucose (POC) 93 08/24/2015 08:15 AM    BUN (POC) 15 08/24/2015 08:15 AM    Creatinine (POC) 0.8 08/24/2015 08:15 AM    Calcium, ionized (POC) 1.24 08/24/2015 08:15 AM     Lab Results   Component Value Date/Time    Bilirubin, total 0.5 10/23/2019 03:42 PM    ALT (SGPT) 17 10/23/2019 03:42 PM    Alk. phosphatase 76 10/23/2019 03:42 PM    Protein, total 6.9 10/23/2019 03:42 PM    Albumin 4.5 10/23/2019 03:42 PM    Globulin 3.8 12/21/2015 11:24 AM     LDH:  Recent Labs     07/20/20  1509 10/23/19  1542    180     Beta-HCG:  Recent Labs     07/20/20  1509 10/23/19  1542   [id number] <1 <1     AFP:  Recent Labs     07/20/20  1509 10/23/19  1542   AFP 2.0 1.5       10/23/2019  Total testosterone: 544  Free testosterone: 6.8 (L)      CT C/A/P 10/9/2017: no evidence of recurrence    CT C/A/P 10/27/2018: There is a new trace left pleural effusion with underlying atelectasis. Stable   pleural thickening right lung base. Stable postoperative changes. Stable 4 mm   nodular density left apex. No evidence of metastatic disease in the abdomen or   pelvis. CT C/A/P 10/25/2019: stable, no evidence of recurrence    Assessment:   1) Testicular Cancer (Non-seminoma)  Stage IIIC  He had disease involving his testicle, retroperitoneum, and lungs. Poor risk disease based on pretreatment BHCG of 219k, which translates to a 5 year OS of about 50%. He has been managed with curative intent. He is s/p chemotherapy with 4 cycles of BEP, followed by RPLND and bilateral VATS 11/2015 to resect residual teratoma. He has no evidence of disease recurrence at this time, based on history, exam, labwork. We will continue with surveillance with H&P and labs in another 6 months, and then reduce to yearly until 10 years out. I will obtain one more CT at the 5 year elizabeth, given the prior teratoma in his abdomen and lungs, and then only as indicated. 2) Poor concentration  Continues to slowly improve with time. Off medications now. 3) Fatigue  Normal TSH and total testosterone. Low free testosterone back in 10/2019, but normal now.   He reports energy has been slowly improving with time. Plan:     · Labs in 6 months: AFP, beta-HCG, LD ([facility])  · CT C/A/P in 6 months  · Return to clinic in 6 months    [address] [address] for periodic video visits. Signed By: Linda Horne MD      I was in the office while conducting this encounter. The patient was at his home. Consent:  He and/or his healthcare decision maker is aware that this patient-initiated Telehealth encounter is a billable service, with coverage as determined by his insurance carrier. He is aware that he may receive a bill and has provided verbal consent to proceed: Yes    Pursuant to the emergency declaration under the [employer] and the [facility], 1135 waiver authority and the [employer] and [employer] General Act, this Virtual  Visit was conducted, with patient's (and/or legal guardian's) consent, to reduce the patient's risk of exposure to COVID-19 and provide necessary medical care. Services were provided through a video synchronous discussion virtually to substitute for in-person visit.

## 2020-07-22 LAB
AFP-TM SERPL-MCNC: 2 NG/ML (ref 0–8.3)
HCG INTACT+B SERPL-ACNC: <1 MIU/ML (ref 0–3)
LDH SERPL-CCNC: 162 IU/L (ref 121–224)
TESTOST FREE SERPL-MCNC: 9.1 PG/ML (ref 8.7–25.1)
TESTOST SERPL-MCNC: 578 NG/DL (ref 264–916)

## 2020-07-24 ENCOUNTER — VIRTUAL VISIT (OUTPATIENT)
Dept: ONCOLOGY | Age: 30
End: 2020-07-24

## 2020-07-24 ENCOUNTER — TELEPHONE (OUTPATIENT)
Dept: ONCOLOGY | Age: 30
End: 2020-07-24

## 2020-07-24 DIAGNOSIS — C77.2 METASTASIS TO RETROPERITONEAL LYMPH NODE (HCC): ICD-10-CM

## 2020-07-24 DIAGNOSIS — C62.11 MALIGNANT NEOPLASM OF DESCENDED RIGHT TESTIS (HCC): Primary | ICD-10-CM

## 2020-07-24 DIAGNOSIS — C78.00 MALIGNANT NEOPLASM METASTATIC TO LUNG, UNSPECIFIED LATERALITY (HCC): ICD-10-CM

## 2020-07-24 NOTE — PATIENT INSTRUCTIONS
Thank you for participating in the virtual visit with Dr. Agustín Rubio. We will call you soon to schedule a follow up appointment with us in 6 months. If you do not hear from us, please call us at [phone] to schedule your appointment. Please use the enclosed lab slip to have your labs done before your next appointment.

## 2020-07-24 NOTE — PROGRESS NOTES
Marielena Marquez is a 31 y.o. male follow up for testicular cancer. 1. Have you been to the ER, urgent care clinic since your last visit? Hospitalized since your last visit?no    2. Have you seen or consulted any other health care providers outside of the [address] [place] [address] since your last visit? Include any pap smears or colon screening.  no

## 2021-01-08 NOTE — PROGRESS NOTES
Can[facility] [place] at [facility]  [address] [facility], [address] [facility] 1007 [facility]  Mary Freye: [phone]  F: [phone]      Reason for Visit:   Brea Palacios is a 31 y.o. male who is seen for follow up of testicular cancer. Treatment History:    · Presented with right testicular mass  · US scrotum 6/4/2015: 9.3 cm mixed solid more than cystic mass replaces the normal architecture of the right testicle. Normal sonographic appearance of the left testicle. · CTA Chest 6/4/2015: Bilateral lung masses  · CT A/P 6/4/2015: Massive retroperitoneal adenopathy, large right testicular mass (9cm)  · MRI Brain 6/4/2015: no intracranial metastatic disease  · Preoperative tumor Markers 6/4/2015: , .5, BHCG [id number]  · Right orchiectomy 6/9/2015 with Dr. 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 Matias Root at [facility] lymph nodes for evaluation; Yausp-jcgzj-paqro-metastatic mature teratoma 2.2 cm, multiple necrotic nodules without residual viable germ cell tumor identified; para-aortic-metastatic mature teratoma 0.9 cm, multiple necrotic nodules without residual viable germ cell tumor identified; Spermatic cord (right)-fibrovascular soft tissue, no germ cell tumor identified; Right pelvic- 0/2 nodes involved, no necrotic nodules or germ cell tumor identified. · Thoracic surgery 11/7/2015 by Dr. Anum Enrique at [facility] 1036 lobectomy, LLL wedge resection and left superior segment wedge resection: Necrotic nodules with surrounding histiocytic reaction and focal squaumous epithelium. No definitive viable malignant cells identified, findings consistent with treatment effect and complete response to chemotherapy. History of Present Illness:   He reports feeling \"fine. \"  Some chronic fatigue, but attributes this to being busy with work and part-time school. 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 Denies pain. Moving bowels normally. No dyspnea, no cough. Review of systems was obtained and pertinent findings reviewed above. Past medical history, social history, family history, medications, and allergies are located in the electronic medical record. Physical Exam:     Visit Vitals  /78 (BP 1 Location: Left arm, BP Patient Position: Sitting) Comment: . Pulse 79   Temp 98.8 °F (37.1 °C) (Oral) Comment: .   Resp 20   Ht 5' 8\" (1.727 m)   Wt 165 lb (74.8 kg)   SpO2 98%   BMI 25.09 kg/m²     ECOG PS: 0  General: no distress  Eyes: anicteric sclerae  HENT: oropharynx clear  Neck: supple  Lymphatic: no cervical, supraclavicular, or inguinal adenopathy  Respiratory: normal respiratory effort  CV: no peripheral edema  GI: soft, nontender, nondistended, no masses  Skin: no rashes; no ecchymoses; no petechiae      Results:     Lab Results   Component Value Date/Time    WBC 12.5 (H) 01/11/2021 11:30 AM    HGB 15.8 01/11/2021 11:30 AM    HCT 46.9 01/11/2021 11:30 AM    PLATELET 623 94/60/2511 11:30 AM    MCV 86 01/11/2021 11:30 AM    ABS.  NEUTROPHILS 9.4 (H) 01/11/2021 11:30 AM    Hemoglobin (POC) 10.9 (L) 08/24/2015 08:15 AM    Hematocrit (POC) 32 (L) 08/24/2015 08:15 AM     Lab Results   Component Value Date/Time    Sodium 138 01/11/2021 11:30 AM    Potassium 4.7 01/11/2021 11:30 AM    Chloride 99 01/11/2021 11:30 AM    CO2 27 01/11/2021 11:30 AM    Glucose 105 (H) 01/11/2021 11:30 AM    BUN 15 01/11/2021 11:30 AM    Creatinine 0.77 01/11/2021 11:30 AM    GFR est  01/11/2021 11:30 AM    GFR est non- 01/11/2021 11:30 AM    Calcium 9.6 01/11/2021 11:30 AM    Sodium (POC) 138 08/24/2015 08:15 AM    Potassium (POC) 3.9 08/24/2015 08:15 AM    Chloride (POC) 101 08/24/2015 08:15 AM    Glucose (POC) 93 08/24/2015 08:15 AM    BUN (POC) 15 08/24/2015 08:15 AM    Creatinine (POC) 0.8 08/24/2015 08:15 AM    Calcium, ionized (POC) 1.24 08/24/2015 08:15 AM     Lab Results   Component Value Date/Time    Bilirubin, total 0.7 01/11/2021 11:30 AM    ALT (SGPT) 16 01/11/2021 11:30 AM    Alk. phosphatase 76 01/11/2021 11:30 AM    Protein, total 7.2 01/11/2021 11:30 AM    Albumin 4.7 01/11/2021 11:30 AM    Globulin 3.8 12/21/2015 11:24 AM     LDH:  Recent Labs     01/11/21  1130 07/20/20  1509    162     Beta-HCG:  Recent Labs     01/11/21  1130 07/20/20  1509   [id number]  --  <1   HCGTLT <1  --      AFP:  Recent Labs     01/11/21  1130 07/20/20  1509   AFP 1.8 2.0       10/23/2019  Total testosterone: 544  Free testosterone: 6.8 (L)      CT C/A/P 10/9/2017: no evidence of recurrence    CT C/A/P 10/27/2018: There is a new trace left pleural effusion with underlying atelectasis. Stable   pleural thickening right lung base. Stable postoperative changes. Stable 4 mm   nodular density left apex. No evidence of metastatic disease in the abdomen or   pelvis. CT C/A/P 10/25/2019: stable, no evidence of recurrence    CT C/A/P 1/11/201:  1. No definite evidence of metastatic disease in the chest, abdomen, and pelvis. Small pulmonary nodule left upper lobe unchanged.   2.  Stable small bilateral pleural effusions versus pleural thickening. Test results above have been reviewed. Assessment:   1) Testicular Cancer (Non-seminoma)  Stage IIIC  He had disease involving his testicle, retroperitoneum, and lungs. Poor risk disease based on pretreatment BHCG of 219k, which translates to a 5 year OS of about 50%. He has been managed with curative intent. He is s/p chemotherapy with 4 cycles of BEP, followed by RPLND and bilateral VATS 11/2015 to resect residual teratoma. He has no evidence of disease recurrence at this time, now over 5 years out from the end of therapy.   I will reduce surveillance to H&P and labs yearly, with additional imaging only as indicated. 2) Poor concentration  Continues to slowly improve with time. Off medications now. 3) Fatigue  Normal TSH and total testosterone. Low free testosterone back in 10/2019, but normal now. He reports energy has been slowly improving with time. 4) Neutrophilia  Mild. New on recent labs. Uncertain etiology, no recent infections. Attention on next labs.     Plan:     · Labs in 12 months: CBC, CMP, AFP, beta-HCG, LD ([facility])  · Return to clinic in 12 months       Signed By: Alexey Beauchamp MD

## 2021-01-11 ENCOUNTER — HOSPITAL ENCOUNTER (OUTPATIENT)
Dept: CT IMAGING | Age: 31
Discharge: HOME | End: 2021-01-11
Attending: INTERNAL MEDICINE
Payer: MEDICARE

## 2021-01-11 DIAGNOSIS — C62.11 MALIGNANT NEOPLASM OF DESCENDED RIGHT TESTIS (HCC): ICD-10-CM

## 2021-01-11 DIAGNOSIS — C78.00 MALIGNANT NEOPLASM METASTATIC TO LUNG, UNSPECIFIED LATERALITY (HCC): ICD-10-CM

## 2021-01-11 DIAGNOSIS — C77.2 METASTASIS TO RETROPERITONEAL LYMPH NODE (HCC): ICD-10-CM

## 2021-01-11 PROCEDURE — 74177 CT ABD & PELVIS W/CONTRAST: CPT

## 2021-01-11 PROCEDURE — 74011000636 HC RX REV CODE- 636: Performed by: RADIOLOGY

## 2021-01-11 RX ADMIN — IOPAMIDOL 100 ML: 755 INJECTION, SOLUTION INTRAVENOUS at 09:28

## 2021-01-13 LAB
AFP-TM SERPL-MCNC: 1.8 NG/ML (ref 0–8.3)
ALBUMIN SERPL-MCNC: 4.7 G/DL (ref 4–5)
ALBUMIN/GLOB SERPL: 1.9 {RATIO} (ref 1.2–2.2)
ALP SERPL-CCNC: 76 IU/L (ref 39–117)
ALT SERPL-CCNC: 16 IU/L (ref 0–44)
AST SERPL-CCNC: 24 IU/L (ref 0–40)
BASOPHILS # BLD AUTO: 0.1 X10E3/UL (ref 0–0.2)
BASOPHILS NFR BLD AUTO: 0 %
BILIRUB SERPL-MCNC: 0.7 MG/DL (ref 0–1.2)
BUN SERPL-MCNC: 15 MG/DL (ref 6–20)
BUN/CREAT SERPL: 19 (ref 9–20)
CALCIUM SERPL-MCNC: 9.6 MG/DL (ref 8.7–10.2)
CHLORIDE SERPL-SCNC: 99 MMOL/L (ref 96–106)
CO2 SERPL-SCNC: 27 MMOL/L (ref 20–29)
CREAT SERPL-MCNC: 0.77 MG/DL (ref 0.76–1.27)
EOSINOPHIL # BLD AUTO: 0 X10E3/UL (ref 0–0.4)
EOSINOPHIL NFR BLD AUTO: 0 %
ERYTHROCYTE [DISTWIDTH] IN BLOOD BY AUTOMATED COUNT: 12.3 % (ref 11.6–15.4)
GLOBULIN SER CALC-MCNC: 2.5 G/DL (ref 1.5–4.5)
GLUCOSE SERPL-MCNC: 105 MG/DL (ref 65–99)
HCG INTACT+B SERPL-ACNC: <1 MIU/ML (ref 0–3)
HCT VFR BLD AUTO: 46.9 % (ref 37.5–51)
HGB BLD-MCNC: 15.8 G/DL (ref 13–17.7)
IMM GRANULOCYTES # BLD AUTO: 0 X10E3/UL (ref 0–0.1)
IMM GRANULOCYTES NFR BLD AUTO: 0 %
LDH SERPL-CCNC: 194 IU/L (ref 121–224)
LYMPHOCYTES # BLD AUTO: 2.3 X10E3/UL (ref 0.7–3.1)
LYMPHOCYTES NFR BLD AUTO: 18 %
MCH RBC QN AUTO: 29.1 PG (ref 26.6–33)
MCHC RBC AUTO-ENTMCNC: 33.7 G/DL (ref 31.5–35.7)
MCV RBC AUTO: 86 FL (ref 79–97)
MONOCYTES # BLD AUTO: 0.7 X10E3/UL (ref 0.1–0.9)
MONOCYTES NFR BLD AUTO: 6 %
NEUTROPHILS # BLD AUTO: 9.4 X10E3/UL (ref 1.4–7)
NEUTROPHILS NFR BLD AUTO: 76 %
PLATELET # BLD AUTO: 261 X10E3/UL (ref 150–450)
POTASSIUM SERPL-SCNC: 4.7 MMOL/L (ref 3.5–5.2)
PROT SERPL-MCNC: 7.2 G/DL (ref 6–8.5)
RBC # BLD AUTO: 5.43 X10E6/UL (ref 4.14–5.8)
SODIUM SERPL-SCNC: 138 MMOL/L (ref 134–144)
WBC # BLD AUTO: 12.5 X10E3/UL (ref 3.4–10.8)

## 2021-01-15 ENCOUNTER — OFFICE VISIT (OUTPATIENT)
Dept: ONCOLOGY | Age: 31
End: 2021-01-15
Payer: COMMERCIAL

## 2021-01-15 VITALS
DIASTOLIC BLOOD PRESSURE: 78 MMHG | RESPIRATION RATE: 20 BRPM | HEART RATE: 79 BPM | SYSTOLIC BLOOD PRESSURE: 133 MMHG | HEIGHT: 68 IN | BODY MASS INDEX: 25.01 KG/M2 | TEMPERATURE: 98.8 F | WEIGHT: 165 LBS | OXYGEN SATURATION: 98 %

## 2021-01-15 DIAGNOSIS — C62.11 MALIGNANT NEOPLASM OF DESCENDED RIGHT TESTIS (HCC): Primary | ICD-10-CM

## 2021-01-15 PROCEDURE — 99214 OFFICE O/P EST MOD 30 MIN: CPT | Performed by: INTERNAL MEDICINE

## 2021-01-15 NOTE — PROGRESS NOTES
Stephanie Mack is a 31 y.o. male follow up testicular cancer. 1. Have you been to the ER, urgent care clinic since your last visit? Hospitalized since your last visit?no    2. Have you seen or consulted any other health care providers outside of the [address] [place] [address] since your last visit? Include any pap smears or colon screening.  no

## 2022-01-10 NOTE — PROGRESS NOTES
[facility] at [facility]  [address] [address] [facility]: [phone]  F: [phone]      Reason for Visit:   Glenn Diallo is a 27 y.o. male who is seen for follow up of testicular cancer. Treatment History:    · Presented with right testicular mass  · US scrotum 6/4/2015: 9.3 cm mixed solid more than cystic mass replaces the normal architecture of the right testicle. Normal sonographic appearance of the left testicle. · CTA Chest 6/4/2015: Bilateral lung masses  · CT A/P 6/4/2015: Massive retroperitoneal adenopathy, large right testicular mass (9cm)  · MRI Brain 6/4/2015: no intracranial metastatic disease  · Preoperative tumor Markers 6/4/2015: , .5, BHCG 250334  · Right orchiectomy 6/9/2015 with Dr. 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 · Thoracic surgery 11/7/2015 by Dr. Mary Sosa at [facility] 1036 lobectomy, LLL wedge resection and left superior segment wedge resection: Necrotic nodules with surrounding histiocytic reaction and focal squaumous epithelium. No definitive viable malignant cells identified, findings consistent with treatment effect and complete response to chemotherapy.      History of Present Illness:   He reports doing well since last here.  No new medical problems. No new medications. No pain. No dyspnea or cough. No bowel problems. No urinating problems. Review of systems was obtained and pertinent findings reviewed above. Past medical history, social history, family history, medications, and allergies are located in the electronic medical record. Physical Exam:     Visit Vitals  BP (!) 148/90 (BP 1 Location: Right arm, BP Patient Position: Sitting)   Pulse 81   Temp 98.2 °F (36.8 °C) (Temporal)   Resp 16   Ht 5' 8\" (1.727 m)   Wt 187 lb 6.4 oz (85 kg)   SpO2 96%   BMI 28.49 kg/m²     ECOG PS: 0  General: no distress  Eyes: anicteric sclerae  HENT: oropharynx clear  Neck: supple  Lymphatic: no cervical, supraclavicular, or inguinal adenopathy  Respiratory: normal respiratory effort  CV: no peripheral edema  GI: soft, nontender, nondistended, no masses  Skin: no rashes; no ecchymoses; no petechiae      Results:     Lab Results   Component Value Date/Time    WBC 7.1 01/11/2022 03:00 PM    HGB 15.8 01/11/2022 03:00 PM    HCT 46.4 01/11/2022 03:00 PM    PLATELET 162 17/90/6063 03:00 PM    MCV 83 01/11/2022 03:00 PM    ABS.  NEUTROPHILS 3.5 01/11/2022 03:00 PM    Hemoglobin (POC) 10.9 (L) 08/24/2015 08:15 AM    Hematocrit (POC) 32 (L) 08/24/2015 08:15 AM     Lab Results   Component Value Date/Time    Sodium 140 01/11/2022 03:00 PM    Potassium 4.5 01/11/2022 03:00 PM    Chloride 99 01/11/2022 03:00 PM    CO2 26 01/11/2022 03:00 PM    Glucose 92 01/11/2022 03:00 PM    BUN 20 01/11/2022 03:00 PM    Creatinine 0.86 01/11/2022 03:00 PM    GFR est  01/11/2022 03:00 PM    GFR est non- 01/11/2022 03:00 PM    Calcium 9.6 01/11/2022 03:00 PM    Sodium (POC) 138 08/24/2015 08:15 AM    Potassium (POC) 3.9 08/24/2015 08:15 AM    Chloride (POC) 101 08/24/2015 08:15 AM    Glucose (POC) 93 08/24/2015 08:15 AM    BUN (POC) 15 08/24/2015 08:15 AM    Creatinine (POC) 0.8 08/24/2015 08:15 AM    Calcium, ionized (POC) 1.24 08/24/2015 08:15 AM Lab Results   Component Value Date/Time    Bilirubin, total 0.4 01/11/2022 03:00 PM    ALT (SGPT) 46 (H) 01/11/2022 03:00 PM    Alk. phosphatase 76 01/11/2022 03:00 PM    Protein, total 7.5 01/11/2022 03:00 PM    Albumin 4.9 01/11/2022 03:00 PM    Globulin 3.8 12/21/2015 11:24 AM     LDH:  Recent Labs     01/11/22  1500        Beta-HCG:  Recent Labs     01/11/22  1500   HCGTLT <1     AFP:  Recent Labs     01/11/22  1500   AFP 1.8       10/23/2019  Total testosterone: 544  Free testosterone: 6.8 (L)      CT C/A/P 10/9/2017: no evidence of recurrence    CT C/A/P 10/27/2018: There is a new trace left pleural effusion with underlying atelectasis. Stable   pleural thickening right lung base. Stable postoperative changes. Stable 4 mm   nodular density left apex. No evidence of metastatic disease in the abdomen or   pelvis. CT C/A/P 10/25/2019: stable, no evidence of recurrence    CT C/A/P 1/11/2021:  1. No definite evidence of metastatic disease in the chest, abdomen, and pelvis. Small pulmonary nodule left upper lobe unchanged.   2.  Stable small bilateral pleural effusions versus pleural thickening. Assessment:   1) Testicular Cancer (Non-seminoma)  Stage IIIC  He had disease involving his testicle, retroperitoneum, and lungs. Poor risk disease based on pretreatment BHCG of 219k, which translates to a 5 year OS of about 50%. He has been managed with curative intent. He is s/p chemotherapy with 4 cycles of BEP, followed by RPLND and bilateral VATS 11/2015 to resect residual teratoma. He has no evidence of disease recurrence at this time, now over 6 years out from the end of therapy. We will continue with yearly surveillance to include H&P and labwork, with additional imaging only as indicated. 2) Fatigue  Improved now. Previous lab workup was ok, including TSH and testosterone. 4) Neutrophilia  Resolved on repeat labs.     Plan:     · Labs in 12 months: CBC, CMP, AFP, beta-HCG, LD (Gerda Dorsey)  · Return to clinic in 12 months       Signed By: Virginia Austin MD

## 2022-01-12 LAB
AFP-TM SERPL-MCNC: 1.8 NG/ML (ref 0–8.3)
ALBUMIN SERPL-MCNC: 4.9 G/DL (ref 4–5)
ALBUMIN/GLOB SERPL: 1.9 {RATIO} (ref 1.2–2.2)
ALP SERPL-CCNC: 76 IU/L (ref 44–121)
ALT SERPL-CCNC: 46 IU/L (ref 0–44)
AST SERPL-CCNC: 32 IU/L (ref 0–40)
BASOPHILS # BLD AUTO: 0.1 X10E3/UL (ref 0–0.2)
BASOPHILS NFR BLD AUTO: 1 %
BILIRUB SERPL-MCNC: 0.4 MG/DL (ref 0–1.2)
BUN SERPL-MCNC: 20 MG/DL (ref 6–20)
BUN/CREAT SERPL: 23 (ref 9–20)
CALCIUM SERPL-MCNC: 9.6 MG/DL (ref 8.7–10.2)
CHLORIDE SERPL-SCNC: 99 MMOL/L (ref 96–106)
CO2 SERPL-SCNC: 26 MMOL/L (ref 20–29)
CREAT SERPL-MCNC: 0.86 MG/DL (ref 0.76–1.27)
EOSINOPHIL # BLD AUTO: 0.1 X10E3/UL (ref 0–0.4)
EOSINOPHIL NFR BLD AUTO: 2 %
ERYTHROCYTE [DISTWIDTH] IN BLOOD BY AUTOMATED COUNT: 12.1 % (ref 11.6–15.4)
GLOBULIN SER CALC-MCNC: 2.6 G/DL (ref 1.5–4.5)
GLUCOSE SERPL-MCNC: 92 MG/DL (ref 65–99)
HCG INTACT+B SERPL-ACNC: <1 MIU/ML (ref 0–3)
HCT VFR BLD AUTO: 46.4 % (ref 37.5–51)
HGB BLD-MCNC: 15.8 G/DL (ref 13–17.7)
IMM GRANULOCYTES # BLD AUTO: 0 X10E3/UL (ref 0–0.1)
IMM GRANULOCYTES NFR BLD AUTO: 0 %
LDH SERPL-CCNC: 186 IU/L (ref 121–224)
LYMPHOCYTES # BLD AUTO: 2.7 X10E3/UL (ref 0.7–3.1)
LYMPHOCYTES NFR BLD AUTO: 39 %
MCH RBC QN AUTO: 28.4 PG (ref 26.6–33)
MCHC RBC AUTO-ENTMCNC: 34.1 G/DL (ref 31.5–35.7)
MCV RBC AUTO: 83 FL (ref 79–97)
MONOCYTES # BLD AUTO: 0.6 X10E3/UL (ref 0.1–0.9)
MONOCYTES NFR BLD AUTO: 9 %
NEUTROPHILS # BLD AUTO: 3.5 X10E3/UL (ref 1.4–7)
NEUTROPHILS NFR BLD AUTO: 49 %
PLATELET # BLD AUTO: 300 X10E3/UL (ref 150–450)
POTASSIUM SERPL-SCNC: 4.5 MMOL/L (ref 3.5–5.2)
PROT SERPL-MCNC: 7.5 G/DL (ref 6–8.5)
RBC # BLD AUTO: 5.57 X10E6/UL (ref 4.14–5.8)
SODIUM SERPL-SCNC: 140 MMOL/L (ref 134–144)
WBC # BLD AUTO: 7.1 X10E3/UL (ref 3.4–10.8)

## 2022-01-14 ENCOUNTER — OFFICE VISIT (OUTPATIENT)
Dept: ONCOLOGY | Age: 32
End: 2022-01-14
Payer: COMMERCIAL

## 2022-01-14 VITALS
HEIGHT: 68 IN | BODY MASS INDEX: 28.4 KG/M2 | OXYGEN SATURATION: 96 % | DIASTOLIC BLOOD PRESSURE: 90 MMHG | SYSTOLIC BLOOD PRESSURE: 148 MMHG | WEIGHT: 187.4 LBS | TEMPERATURE: 98.2 F | RESPIRATION RATE: 16 BRPM | HEART RATE: 81 BPM

## 2022-01-14 DIAGNOSIS — C77.2 METASTASIS TO RETROPERITONEAL LYMPH NODE (HCC): ICD-10-CM

## 2022-01-14 DIAGNOSIS — C62.11 MALIGNANT NEOPLASM OF DESCENDED RIGHT TESTIS (HCC): Primary | ICD-10-CM

## 2022-01-14 PROCEDURE — 99214 OFFICE O/P EST MOD 30 MIN: CPT | Performed by: INTERNAL MEDICINE

## 2022-01-14 NOTE — PROGRESS NOTES
Glenn Diallo is a 27 y.o. male here for follow up of testicular cancer. 1. Have you been to the ER, urgent care clinic since your last visit? Hospitalized since your last visit? No    2. Have you seen or consulted any other health care providers outside of the [address] [place] [address] since your last visit? Include any pap smears or colon screening.  No

## 2022-03-18 PROBLEM — C77.2 METASTASIS TO RETROPERITONEAL LYMPH NODE (HCC): Status: ACTIVE | Noted: 2018-04-18

## 2022-03-18 PROBLEM — H18.609 KERATOCONUS: Status: ACTIVE | Noted: 2017-01-23

## 2022-03-18 PROBLEM — C78.00 MALIGNANT NEOPLASM METASTATIC TO LUNG (HCC): Status: ACTIVE | Noted: 2018-04-18

## 2022-03-19 PROBLEM — F33.1 MAJOR DEPRESSIVE DISORDER, RECURRENT EPISODE, MODERATE (HCC): Status: ACTIVE | Noted: 2017-10-18
